# Patient Record
Sex: MALE | Race: WHITE | NOT HISPANIC OR LATINO | Employment: OTHER | URBAN - METROPOLITAN AREA
[De-identification: names, ages, dates, MRNs, and addresses within clinical notes are randomized per-mention and may not be internally consistent; named-entity substitution may affect disease eponyms.]

---

## 2017-01-30 ENCOUNTER — ALLSCRIPTS OFFICE VISIT (OUTPATIENT)
Dept: OTHER | Facility: OTHER | Age: 77
End: 2017-01-30

## 2017-01-30 DIAGNOSIS — R10.30 LOWER ABDOMINAL PAIN: ICD-10-CM

## 2017-02-06 ENCOUNTER — HOSPITAL ENCOUNTER (OUTPATIENT)
Dept: RADIOLOGY | Facility: HOSPITAL | Age: 77
Discharge: HOME | End: 2017-02-06
Payer: COMMERCIAL

## 2017-02-06 DIAGNOSIS — R10.30 LOWER ABDOMINAL PAIN: ICD-10-CM

## 2017-02-06 PROCEDURE — 73502 X-RAY EXAM HIP UNI 2-3 VIEWS: CPT

## 2017-02-06 PROCEDURE — 72110 X-RAY EXAM L-2 SPINE 4/>VWS: CPT

## 2017-02-07 ENCOUNTER — GENERIC CONVERSION - ENCOUNTER (OUTPATIENT)
Dept: OTHER | Facility: OTHER | Age: 77
End: 2017-02-07

## 2017-08-22 ENCOUNTER — ALLSCRIPTS OFFICE VISIT (OUTPATIENT)
Dept: OTHER | Facility: OTHER | Age: 77
End: 2017-08-22

## 2017-08-22 DIAGNOSIS — E78.00 PURE HYPERCHOLESTEROLEMIA: ICD-10-CM

## 2017-08-22 DIAGNOSIS — I10 ESSENTIAL (PRIMARY) HYPERTENSION: ICD-10-CM

## 2017-08-22 DIAGNOSIS — N20.0 CALCULUS OF KIDNEY: ICD-10-CM

## 2017-09-07 ENCOUNTER — APPOINTMENT (OUTPATIENT)
Dept: LAB | Facility: CLINIC | Age: 77
End: 2017-09-07
Payer: COMMERCIAL

## 2017-09-07 ENCOUNTER — GENERIC CONVERSION - ENCOUNTER (OUTPATIENT)
Dept: OTHER | Facility: OTHER | Age: 77
End: 2017-09-07

## 2017-09-07 ENCOUNTER — TRANSCRIBE ORDERS (OUTPATIENT)
Dept: LAB | Facility: CLINIC | Age: 77
End: 2017-09-07

## 2017-09-07 DIAGNOSIS — I10 ESSENTIAL (PRIMARY) HYPERTENSION: ICD-10-CM

## 2017-09-07 DIAGNOSIS — E78.00 PURE HYPERCHOLESTEROLEMIA: ICD-10-CM

## 2017-09-07 LAB
ALBUMIN SERPL BCP-MCNC: 3.6 G/DL (ref 3.5–5)
ALP SERPL-CCNC: 63 U/L (ref 46–116)
ALT SERPL W P-5'-P-CCNC: 30 U/L (ref 12–78)
AMORPH URATE CRY URNS QL MICRO: ABNORMAL /HPF
ANION GAP SERPL CALCULATED.3IONS-SCNC: 6 MMOL/L (ref 4–13)
AST SERPL W P-5'-P-CCNC: 18 U/L (ref 5–45)
BACTERIA UR QL AUTO: ABNORMAL /HPF
BILIRUB SERPL-MCNC: 0.73 MG/DL (ref 0.2–1)
BILIRUB UR QL STRIP: ABNORMAL
BUN SERPL-MCNC: 14 MG/DL (ref 5–25)
CALCIUM SERPL-MCNC: 9.1 MG/DL (ref 8.3–10.1)
CHLORIDE SERPL-SCNC: 104 MMOL/L (ref 100–108)
CHOLEST SERPL-MCNC: 165 MG/DL (ref 50–200)
CLARITY UR: ABNORMAL
CO2 SERPL-SCNC: 30 MMOL/L (ref 21–32)
COLOR UR: ABNORMAL
CREAT SERPL-MCNC: 0.73 MG/DL (ref 0.6–1.3)
GFR SERPL CREATININE-BSD FRML MDRD: 91 ML/MIN/1.73SQ M
GLUCOSE P FAST SERPL-MCNC: 98 MG/DL (ref 65–99)
GLUCOSE UR STRIP-MCNC: NEGATIVE MG/DL
HDLC SERPL-MCNC: 48 MG/DL (ref 40–60)
HGB UR QL STRIP.AUTO: ABNORMAL
KETONES UR STRIP-MCNC: NEGATIVE MG/DL
LEUKOCYTE ESTERASE UR QL STRIP: NEGATIVE
MUCOUS THREADS UR QL AUTO: ABNORMAL
NITRITE UR QL STRIP: NEGATIVE
NON-SQ EPI CELLS URNS QL MICRO: ABNORMAL /HPF
NONHDLC SERPL-MCNC: 117 MG/DL
PH UR STRIP.AUTO: 6 [PH] (ref 4.5–8)
POTASSIUM SERPL-SCNC: 4 MMOL/L (ref 3.5–5.3)
PROT SERPL-MCNC: 7.3 G/DL (ref 6.4–8.2)
PROT UR STRIP-MCNC: ABNORMAL MG/DL
RBC #/AREA URNS AUTO: ABNORMAL /HPF
SODIUM SERPL-SCNC: 140 MMOL/L (ref 136–145)
SP GR UR STRIP.AUTO: 1.02 (ref 1–1.03)
UROBILINOGEN UR QL STRIP.AUTO: 0.2 E.U./DL
WBC #/AREA URNS AUTO: ABNORMAL /HPF

## 2017-09-07 PROCEDURE — 36415 COLL VENOUS BLD VENIPUNCTURE: CPT

## 2017-09-07 PROCEDURE — 81001 URINALYSIS AUTO W/SCOPE: CPT

## 2017-09-07 PROCEDURE — 83718 ASSAY OF LIPOPROTEIN: CPT

## 2017-09-07 PROCEDURE — 82465 ASSAY BLD/SERUM CHOLESTEROL: CPT

## 2017-09-07 PROCEDURE — 80053 COMPREHEN METABOLIC PANEL: CPT

## 2017-09-18 ENCOUNTER — GENERIC CONVERSION - ENCOUNTER (OUTPATIENT)
Dept: OTHER | Facility: OTHER | Age: 77
End: 2017-09-18

## 2017-11-02 ENCOUNTER — ALLSCRIPTS OFFICE VISIT (OUTPATIENT)
Dept: OTHER | Facility: OTHER | Age: 77
End: 2017-11-02
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
## 2018-01-12 NOTE — RESULT NOTES
Verified Results  (1) LIPID PANEL, FASTING 75JBU7457 08:53AM [email or web address] Order Number: [id number]     Test Name Result Flag Reference   CHOLESTEROL 169 mg/dL     HDL,DIRECT 50 mg/dL  40-60   Specimen collection should occur prior to Metamizole administration due to the potential for falsely depressed results  LDL CHOLESTEROL CALCULATED 106 mg/dL H 0-100   - Patient Instructions: This is a fasting blood test  Water,black tea or black  coffee only after 9:00pm the night before test   Drink 2 glasses of water the morning of test     - Patient Instructions: This is a fasting blood test  Water,black tea or black  coffee only after 9:00pm the night before test Drink 2 glasses of water the morning of test   Triglyceride:         Normal              <150 mg/dl       Borderline High    150-199 mg/dl       High               200-499 mg/dl       Very High          >499 mg/dl  Cholesterol:         Desirable        <200 mg/dl      Borderline High  200-239 mg/dl      High             >239 mg/dl  HDL Cholesterol:        High    >59 mg/dL      Low     <41 mg/dL  LDL CALCULATED:    This screening LDL is a calculated result  It does not have the accuracy of the Direct Measured LDL in the monitoring of patients with hyperlipidemia and/or statin therapy  Direct Measure LDL (CZG285) must be ordered separately in these patients  TRIGLYCERIDES 65 mg/dL  <=150   Specimen collection should occur prior to N-Acetylcysteine or Metamizole administration due to the potential for falsely depressed results  (1) COMPREHENSIVE METABOLIC PANEL 36XJR3872 39:34TI [email or web address] Order Number: [id number]     Test Name Result Flag Reference   GLUCOSE,RANDM 99 mg/dL     If the patient is fasting, the ADA then defines impaired fasting glucose as > 100 mg/dL and diabetes as > or equal to 123 mg/dL     SODIUM 139 mmol/L  136-145   POTASSIUM 4 0 mmol/L  3 5-5 3   CHLORIDE 104 mmol/L  100-108   CARBON DIOXIDE 30 mmol/L  21-32   ANION GAP (CALC) 5 mmol/L  4-13   BLOOD UREA NITROGEN 15 mg/dL  5-25   CREATININE 0 74 mg/dL  0 60-1 30   Standardized to IDMS reference method   CALCIUM 9 3 mg/dL  8 3-10 1   BILI, TOTAL 0 90 mg/dL  0 20-1 00   ALK PHOSPHATAS 63 U/L     ALT (SGPT) 35 U/L  12-78   AST(SGOT) 18 U/L  5-45   ALBUMIN 3 7 g/dL  3 5-5 0   TOTAL PROTEIN 7 2 g/dL  6 4-8 2   eGFR Non-African American      >60 0 ml/min/1 73sq m   - Patient Instructions: This is a fasting blood test  Water,black tea or black  coffee only after 9:00pm the night before test Drink 2 glasses of water the morning of test   National Kidney Disease Education Program recommendations are as follows:  GFR calculation is accurate only with a steady state creatinine  Chronic Kidney disease less than 60 ml/min/1 73 sq  meters  Kidney failure less than 15 ml/min/1 73 sq  meters  (1) PSA (SCREEN) (Dx V76 44 Screen for Prostate Cancer) 41MYS4526 08:53AM [facility] Order Number: [id number]     Test Name Result Flag Reference   PROSTATE SPECIFIC ANTIGEN 2 4 ng/mL  0 0-4 0   American Urological Association Guidelines define biochemical recurrence of prostate cancer as a detectable or rising PSA value post-radical prostatectomy that is greater than or equal to 0 2 ng/mL with a second confirmatory level of greater than or equal to 0 2 ng/mL  - Patient Instructions: This test is non-fasting  Please drink two glasses of water morning of bloodwork  - Patient Instructions: This test is non-fasting  Please drink two glasses of water morning of bloodwork

## 2018-01-13 VITALS
HEIGHT: 70 IN | DIASTOLIC BLOOD PRESSURE: 80 MMHG | HEART RATE: 68 BPM | WEIGHT: 204.25 LBS | SYSTOLIC BLOOD PRESSURE: 138 MMHG | BODY MASS INDEX: 29.24 KG/M2

## 2018-01-13 VITALS
HEIGHT: 70 IN | SYSTOLIC BLOOD PRESSURE: 144 MMHG | WEIGHT: 208.25 LBS | DIASTOLIC BLOOD PRESSURE: 78 MMHG | HEART RATE: 76 BPM | BODY MASS INDEX: 29.81 KG/M2

## 2018-01-14 VITALS
HEIGHT: 70 IN | DIASTOLIC BLOOD PRESSURE: 84 MMHG | TEMPERATURE: 98.4 F | WEIGHT: 207.38 LBS | SYSTOLIC BLOOD PRESSURE: 140 MMHG | BODY MASS INDEX: 29.69 KG/M2

## 2018-01-16 NOTE — RESULT NOTES
Verified Results  * XR HIP/PELV 2-3 VWS RIGHT W PELVIS IF PERFORMED [id number] 09:27AM [facility] Order Number: [id number]   Performing Comments: pain right hip/ inguinal       assess degenerative change     Test Name Result Flag Reference   * XR HIP/PELV 2-3 VWS RIGHT (Report)     RIGHT HIP     INDICATION: Right lateral hip pain     COMPARISON: None     VIEWS: AP pelvis and 2 coned down views of the hip; 3 images     FINDINGS:     There is no acute fracture or dislocation  Mild degenerative changes of the bilateral hips  No lytic or blastic lesions are seen  Soft tissues are unremarkable  IMPRESSION:       1  No acute osseous abnormality  2  Degenerative changes as described  Workstation performed: [id number]     Signed by:   Cassidy De La Rosa MD   2/7/17     * XR SPINE LUMBAR MINIMUM 4 VIEWS NON INJURY [id number] 09:27AM [facility] Order Number: [id number]   Performing Comments: pain right buttocks/ inguinal     Test Name Result Flag Reference   XR SPINE LUMBAR MINIMUM 4 VIEWS (Report)     LUMBAR SPINE     INDICATION: Back and hip pain     COMPARISON: None     VIEWS: AP, lateral and bilateral oblique projections; 4 images     FINDINGS:     There is grade 1 anterolisthesis of L5 on S1  No evidence of spondylolysis  There is no radiographic evidence of acute fracture or destructive osseous lesion  There are moderate degenerative changes of the lower thoracic and lumbar spine most prominent at L4-5 and S1  There are facet joint degenerative changes at L4-5 and L5-S1  Visualized soft tissues appear unremarkable  There are postsurgical changes of the lower abdomen  IMPRESSION:     1  No acute osseous abnormality  Degenerative changes as described  2  Grade 1 anterolisthesis of L5 on S1 secondary to facet joint degenerative change         Workstation performed: [id number]     Signed by:   Cassidy De La Rosa MD   2/7/17

## 2018-01-16 NOTE — RESULT NOTES
Verified Results  (1) BASIC METABOLIC PROFILE 30HUF2324 09:44AM [facility]     Test Name Result Flag Reference   GLUCOSE,RANDM 91 mg/dL     If the patient is fasting, the ADA then defines impaired fasting glucose as > 100 mg/dL and diabetes as > or equal to 123 mg/dL  SODIUM 140 mmol/L  136-145   POTASSIUM 3 9 mmol/L  3 5-5 3   CHLORIDE 105 mmol/L  100-108   CARBON DIOXIDE 29 mmol/L  21-32   ANION GAP (CALC) 6 mmol/L  4-13   BLOOD UREA NITROGEN 13 mg/dL  5-25   CREATININE 0 65 mg/dL  0 60-1 30   Standardized to IDMS reference method   CALCIUM 8 4 mg/dL  8 3-10 1   eGFR Non-African American      >60 0 ml/min/1 73sq [facility] Energy Disease Education Program recommendations are as follows:  GFR calculation is accurate only with a steady state creatinine  Chronic Kidney disease less than 60 ml/min/1 73 sq  meters  Kidney failure less than 15 ml/min/1 73 sq  meters  No known food allergies reported. Pt denies micronutrient supplementation PTA. Currently, pt with a decreased appetite and refusing to eat hospital meals. Denies GI distress at this time and reports no difficulty chewing/swallowing. Pt refused remained of interview.

## 2018-01-17 NOTE — RESULT NOTES
Verified Results  (1) COMPREHENSIVE METABOLIC PANEL [id number] 14:56QF [employer] Order Number: [id number]     Test Name Result Flag Reference   SODIUM 140 mmol/L  136-145   POTASSIUM 4 0 mmol/L  3 5-5 3   CHLORIDE 104 mmol/L  100-108   CARBON DIOXIDE 30 mmol/L  21-32   ANION GAP (CALC) 6 mmol/L  4-13   BLOOD UREA NITROGEN 14 mg/dL  5-25   CREATININE 0 73 mg/dL  0 60-1 30   Standardized to IDMS reference method   CALCIUM 9 1 mg/dL  8 3-10 1   BILI, TOTAL 0 73 mg/dL  0 20-1 00   ALK PHOSPHATAS 63 U/L     ALT (SGPT) 30 U/L  12-78   Specimen collection should occur prior to Sulfasalazine and/or Sulfapyridine administration due to the potential for falsely depressed results  AST(SGOT) 18 U/L  5-45   Specimen collection should occur prior to Sulfasalazine administration due to the potential for falsely depressed results  ALBUMIN 3 6 g/dL  3 5-5 0   TOTAL PROTEIN 7 3 g/dL  6 4-8 2   eGFR 91 ml/min/1 73sq m     National Kidney Disease Education Program recommendations are as follows:  GFR calculation is accurate only with a steady state creatinine  Chronic Kidney disease less than 60 ml/min/1 73 sq  meters  Kidney failure less than 15 ml/min/1 73 sq  meters  GLUCOSE FASTING 98 mg/dL  65-99   Specimen collection should occur prior to Sulfasalazine administration due to the potential for falsely depressed results  Specimen collection should occur prior to Sulfapyridine administration due to the potential for falsely elevated results       (1) URINALYSIS WITH MICROSCOPIC [id number] 08:28AM [employer] Order Number: [id number]     Test Name Result Flag Reference   COLOR Dk Yellow     CLARITY Cloudy     PH UA 6 0  4 5-8 0   LEUKOCYTE ESTERASE UA Negative  Negative   NITRITE UA Negative  Negative   PROTEIN UA Trace mg/dl A Negative   GLUCOSE UA Negative mg/dl  Negative   KETONES UA Negative mg/dl  Negative   UROBILINOGEN UA 0 2 E U /dl  0 2, 1 0 E U /dl   BILIRUBIN UA  A Negative Interference- unable to analyze   The dipstick result may be falsely positive do to interfering substances  We recommend reliance upon serum bilirubin, liver & kidney function tests to guide patient care if clinically indicated  BLOOD UA Trace A Negative   SPECIFIC GRAVITY UA 1 021  1 003-1 030   WBC UA None Seen /hpf  None Seen   RBC UA None Seen /hpf  None Seen   BACTERIA Innumerable /hpf A None Seen, Occasional   AMORPHOUS URATES Moderate /hpf     EPITHELIAL CELLS None Seen /hpf  None Seen, Occasional   MUCOUS THREADS Occasional  Occasional, Moderate, Innumerable     (1) LIPID PANEL, NON FASTING W/O TRIG [id number] 08:28AM [facility] Order Number: [id number]     Test Name Result Flag Reference   NON HDL CHOLESTEROL 117 mg/dl     Cholesterol:         Desirable        <200 mg/dl      Borderline High  200-239 mg/dl      High             >239 mg/dl  HDL Cholesterol:        High    >59 mg/dL      Low     <41 mg/dL  NON-HDL-CHOLESTEROL:          Target Range  <=130mg/dl   CHOLESTEROL 165 mg/dL     HDL,DIRECT 48 mg/dL  40-60   Specimen collection should occur prior to Metamizole administration due to the potential for falsely depressed results

## 2018-01-22 VITALS
BODY MASS INDEX: 29.28 KG/M2 | DIASTOLIC BLOOD PRESSURE: 84 MMHG | SYSTOLIC BLOOD PRESSURE: 152 MMHG | WEIGHT: 204.5 LBS | HEIGHT: 70 IN

## 2018-01-26 RX ORDER — HYDROCHLOROTHIAZIDE 25 MG/1
TABLET ORAL
Refills: 1 | COMMUNITY
Start: 2017-11-12 | End: 2018-02-06 | Stop reason: SDUPTHER

## 2018-01-26 RX ORDER — LATANOPROST 50 UG/ML
SOLUTION/ DROPS OPHTHALMIC
COMMUNITY
End: 2020-09-30 | Stop reason: SDUPTHER

## 2018-01-26 RX ORDER — BRIMONIDINE TARTRATE, TIMOLOL MALEATE 2; 5 MG/ML; MG/ML
SOLUTION/ DROPS OPHTHALMIC
COMMUNITY
Start: 2011-06-02

## 2018-01-26 RX ORDER — SIMVASTATIN 5 MG
1 TABLET ORAL DAILY
COMMUNITY
Start: 2011-08-25 | End: 2018-02-06 | Stop reason: SDUPTHER

## 2018-01-28 ENCOUNTER — ANESTHESIA EVENT (OUTPATIENT)
Dept: GASTROENTEROLOGY | Facility: HOSPITAL | Age: 78
End: 2018-01-28
Payer: COMMERCIAL

## 2018-01-29 ENCOUNTER — ANESTHESIA (OUTPATIENT)
Dept: GASTROENTEROLOGY | Facility: HOSPITAL | Age: 78
End: 2018-01-29
Payer: COMMERCIAL

## 2018-01-29 ENCOUNTER — HOSPITAL ENCOUNTER (OUTPATIENT)
Facility: HOSPITAL | Age: 78
Setting detail: OUTPATIENT SURGERY
Discharge: HOME | End: 2018-01-29
Attending: COLON & RECTAL SURGERY | Admitting: COLON & RECTAL SURGERY
Payer: COMMERCIAL

## 2018-01-29 VITALS
TEMPERATURE: 97.8 F | RESPIRATION RATE: 16 BRPM | WEIGHT: 204 LBS | BODY MASS INDEX: 29.2 KG/M2 | OXYGEN SATURATION: 97 % | HEIGHT: 70 IN | DIASTOLIC BLOOD PRESSURE: 57 MMHG | HEART RATE: 61 BPM | SYSTOLIC BLOOD PRESSURE: 121 MMHG

## 2018-01-29 DIAGNOSIS — Z12.11 ENCOUNTER FOR SCREENING FOR MALIGNANT NEOPLASM OF COLON: ICD-10-CM

## 2018-01-29 DIAGNOSIS — Z86.010 HISTORY OF COLONIC POLYPS: ICD-10-CM

## 2018-01-29 PROCEDURE — 88305 TISSUE EXAM BY PATHOLOGIST: CPT | Performed by: COLON & RECTAL SURGERY

## 2018-01-29 PROCEDURE — 88305 TISSUE EXAM BY PATHOLOGIST: CPT | Performed by: PATHOLOGY

## 2018-01-29 RX ORDER — SODIUM CHLORIDE 9 MG/ML
125 INJECTION, SOLUTION INTRAVENOUS CONTINUOUS
Status: DISCONTINUED | OUTPATIENT
Start: 2018-01-29 | End: 2018-01-29 | Stop reason: HOSPADM

## 2018-01-29 RX ORDER — ONDANSETRON 2 MG/ML
4 INJECTION INTRAMUSCULAR; INTRAVENOUS EVERY 6 HOURS PRN
Status: DISCONTINUED | OUTPATIENT
Start: 2018-01-29 | End: 2018-01-29 | Stop reason: HOSPADM

## 2018-01-29 RX ORDER — PROPOFOL 10 MG/ML
INJECTION, EMULSION INTRAVENOUS AS NEEDED
Status: DISCONTINUED | OUTPATIENT
Start: 2018-01-29 | End: 2018-01-29 | Stop reason: SURG

## 2018-01-29 RX ADMIN — PROPOFOL 50 MG: 10 INJECTION, EMULSION INTRAVENOUS at 08:38

## 2018-01-29 RX ADMIN — PROPOFOL 125 MG: 10 INJECTION, EMULSION INTRAVENOUS at 08:32

## 2018-01-29 RX ADMIN — SODIUM CHLORIDE 125 ML/HR: 0.9 INJECTION, SOLUTION INTRAVENOUS at 07:37

## 2018-01-29 RX ADMIN — PROPOFOL 25 MG: 10 INJECTION, EMULSION INTRAVENOUS at 08:45

## 2018-01-29 RX ADMIN — SODIUM CHLORIDE: 0.9 INJECTION, SOLUTION INTRAVENOUS at 08:30

## 2018-01-29 RX ADMIN — LIDOCAINE HYDROCHLORIDE 60 MG: 20 INJECTION, SOLUTION INTRAVENOUS at 08:32

## 2018-01-29 NOTE — OP NOTE
OPERATIVE REPORT  PATIENT NAME: Kassy Mayo    :  1940  MRN: [id number]  Pt Location: [facility] ROOM 01    SURGERY DATE: 2018    Indications:  Of colon polyps  Personal history    Procedure:  Colonoscopy to cecum  With polypectomies  Findings:  Transverse colon polyp and sigmoid colon polyp    Anesthesia Type: IV Sedation with Anesthesia    Complications: None      Procedure: The patient was in the left lateral position  Sedation was administered by anesthesia  Rectal exam showed a large prostate  The scope was introduced and passed to the cecum  This was confirmed by the ileocecal valve and the appendiceal orifice  The scope was removed  The bowel preparation was poor  In the transverse colon was a diminutive polyp excised with the biopsy forceps  In the sigmoid colon was moderate diverticulosis  There was a 5 mm polyp excised by hot snare and collected  Scope was removed  Impression:  Status post polypectomies  Plan:  Repeat colonoscopy 3 years  Specimen(s):  ID Type Source Tests Collected by Time Destination   1 : Transverse colon polyp Tissue Large Intestine, Transverse Colon TISSUE EXAM Efrem Parsons MD 2018 [id number]          Attestation: I was present for the entire procedure        Patient Disposition: PACU      SIGNATURE: Efrem Parsons MD  DATE: 2018  TIME: 8:55 AM

## 2018-01-29 NOTE — DISCHARGE INSTRUCTIONS
[facility] [address], [address] [place] [address] [address]  Phone: [phone]    DISCHARGE INSTRUCTIONS:    1   ___ Complete Exam - Normal    2   ___ Exam normal, but entire colon not seen  We will discuss this with you  3   ___ Polyp(s) removed by "burning" - NO pathology report will follow    4   _2__ Polyp(s) removed by excision  Pathology report will be available in 4-5 days   Someone from our office will call you with results  5   ___ Exam prompted biopsies  Pathology report will be available in 4-5 days   Someone from our office will call you with results  6   ___ Exam demonstrated findings that need treatment  Prescriptions will be   Given to you  Return to our office in ____ weeks  Please call for appt  7   ___ Original office visit or colonoscopy findings necessitate an office visit  Please call to set up a new appointment    8   ___ Medication  __________________________________________        [address] [facility]:    - Go straight home and rest today    - No driving or drinking alcohol for 24 hours    - Resume regular diet and medications unless otherwise instructed  Coumadin and Plavix are blood thinners  You can resume these medications on __________  IF YOU ARE HAVING ANY FEVER, BLEEDING OR PERSISTENT PAIN IN THE ABDOMEN, PLEASE CALL OUR OFFICE ANY DAY OR TIME  [phone]        Colonoscopy   WHAT YOU NEED TO KNOW:   A colonoscopy is a procedure to examine the inside of your colon (intestine) with a scope  Polyps or tissue growths may have been removed during your colonoscopy  It is normal to feel bloated and to have some abdominal discomfort  You should be passing gas  If you have hemorrhoids or you had polyps removed, you may have a small amount of bleeding  DISCHARGE INSTRUCTIONS:   Seek care immediately if:   · You have a large amount of bright red blood in your bowel movements      · Your abdomen is hard and firm and you have severe pain  · You have sudden trouble breathing  Contact your healthcare provider if:   · You develop a rash or hives  · You have a fever within 24 hours of your procedure  · You have not had a bowel movement for 3 days after your procedure  · You have questions or concerns about your condition or care  Activity:   · Do not lift, strain, or run  for 3 days after your procedure  · Rest after your procedure  You have been given medicine to relax you  Do not  drive or make important decisions until the day after your procedure  Return to your normal activity as directed  · Relieve gas and discomfort from bloating  by lying on your right side with a heating pad on your abdomen  You may need to take short walks to help the gas move out  Eat small meals until bloating is relieved  If you had polyps removed: For 7 days after your procedure:  · Do not  take aspirin  · Do not  go on long car rides  Help prevent constipation:   · Eat a variety of healthy foods  Healthy foods include fruit, vegetables, whole-grain breads, low-fat dairy products, beans, lean meat, and fish  Ask if you need to be on a special diet  Your healthcare provider may recommend that you eat high-fiber foods such as cooked beans  Fiber helps you have regular bowel movements  · Drink liquids as directed  Adults should drink between 9 and 13 eight-ounce cups of liquid every day  Ask what amount is best for you  For most people, good liquids to drink are water, juice, and milk  · Exercise as directed  Talk to your healthcare provider about the best exercise plan for you  Exercise can help prevent constipation, decrease your blood pressure and improve your health  Follow up with your healthcare provider as directed:  Write down your questions so you remember to ask them during your visits     © 2017 [employer] Information is for End User's use only and may not be sold, redistributed or otherwise used for commercial purposes  All illustrations and images included in CareNotes® are the copyrighted property of [employer]  or [employer] Huber  The above information is an  only  It is not intended as medical advice for individual conditions or treatments  Talk to your doctor, nurse or pharmacist before following any medical regimen to see if it is safe and effective for you  Colorectal Polyps   WHAT YOU NEED TO KNOW:   What are colorectal polyps? Colorectal polyps are small growths of tissue in the lining of the colon and rectum  Most polyps are hyperplastic polyps and are usually benign (noncancerous)  Certain types of polyps, called adenomatous polyps, may turn into cancer  What increases my risk of colorectal polyps? The exact cause of colorectal polyps is unknown  The following may increase your risk:  · Older age    · A diet of foods high in fat and low in fiber     · Family history of polyps    · Intestinal diseases, such as Crohn's disease or ulcerative colitis    · An unhealthy lifestyle, such as physical inactivity, smoking, or drinking alcohol    · Obesity  What are the signs and symptoms of colorectal polyps? · Blood in your bowel movement or bleeding from the rectum    · Change in bowel movement habits, such as diarrhea and constipation    · Abdominal pain  How are colorectal polyps diagnosed? You should have fecal blood screening once a year for colorectal disease if you are over 48years old  You should be screened earlier if you have an intestinal disease or a family history of polyps or colorectal cancer  During this screening, a sample of your bowel movement is checked for blood, which may be an early sign of colorectal polyps or cancer  You may also need any of the following tests:  · Digital rectal exam:  Your healthcare provider will examine your anus and use a finger to check your rectum for polyps       · Barium enema: A barium enema is an x-ray of the colon  A tube is put into your anus, and a liquid called barium is put through the tube  Barium is used so that caregivers can see your colon better on the x-ray film  · Virtual colonoscopy: This is a CT scan that takes pictures of the inside of your colon and rectum  A small, flexible tube is put into your rectum and air or carbon dioxide (gas) is used to expand your colon  This lets healthcare providers clearly see your colon and any polyps on a monitor  · Colonoscopy or sigmoidoscopy: These procedures help your healthcare provider see the inside of your colon using a flexible tube with a small light and camera on the end  During a sigmoidoscopy, your healthcare provider will only look at rectum and lower colon  During a colonoscopy, healthcare providers will look at the full length of your colon  Healthcare providers may remove a small amount of tissue from the colon for a biopsy  How are colorectal polyps treated? · Polyp removal:  Polyps may be removed during your sigmoidoscopy or colonoscopy  · Polypectomy: This is surgery to remove your polyps  You may need laparoscopic or open surgery, depending on the type, size, and number of polyps that you have  Laparoscopy is done by inserting a small, flexible scope into incisions made on your abdomen  Open surgery is done by making a larger incision on your abdomen   What are the risks of colorectal polyps? You may bleed during a colonoscopy procedure  Your bowel may be perforated (torn) when polyps are removed  This may lead to an open abdominal surgery  During surgery, you may bleed too much or get an infection  Adenomatous polyps that are not removed may turn into cancer and become more difficult to treat  Where can I find support and more information? · [address] ([facility])  [address], [place] [address]  Phone: [phone]  Web Address: Eddie Steele  [facility] gov  When should I contact my healthcare provider? · You have a fever  · You have chills, a cough, or feel weak and achy  · You have abdominal pain that does not go away or gets worse after you take medicine  · Your abdomen is swollen  · You are losing weight without trying  · You have questions or concerns about your condition or care  When should I seek immediate care or call 911? · You have sudden shortness of breath  · You have a fast heart rate, fast breathing, or are too dizzy to stand up  · You have severe abdominal pain  · You see blood in your bowel movement  CARE AGREEMENT:   You have the right to help plan your care  Learn about your health condition and how it may be treated  Discuss treatment options with your caregivers to decide what care you want to receive  You always have the right to refuse treatment  The above information is an  only  It is not intended as medical advice for individual conditions or treatments  Talk to your doctor, nurse or pharmacist before following any medical regimen to see if it is safe and effective for you  © 2017 [address]  Information is for End User's use only and may not be sold, redistributed or otherwise used for commercial purposes  All illustrations and images included in CareNotes® are the copyrighted property of A [employer] A M , Inc  or Doc Huber

## 2018-01-29 NOTE — ANESTHESIA PREPROCEDURE EVALUATION
Review of Systems/Medical History  Patient summary reviewed  Chart reviewed      Cardiovascular  Exercise tolerance: good,  Hyperlipidemia, Hypertension controlled,    Pulmonary  Negative pulmonary ROS        GI/Hepatic    Bowel prep       Kidney stones,        Endo/Other  Negative endo/other ROS      GYN  Negative gynecology ROS          Hematology  Negative hematology ROS      Musculoskeletal  Negative musculoskeletal ROS        Neurology  Negative neurology ROS      Psychology   Negative psychology ROS              Physical Exam    Airway    Mallampati score: II  TM Distance: <3 FB  Neck ROM: full     Dental       Cardiovascular  Rhythm: regular, Rate: normal,     Pulmonary  Breath sounds clear to auscultation,     Other Findings  partial      Anesthesia Plan  ASA Score- 2     Anesthesia Type- IV sedation with anesthesia with ASA Monitors  Additional Monitors:   Airway Plan:         Plan Factors- Patient instructed to abstain from smoking on day of procedure  Patient did not smoke on day of surgery  Induction- intravenous  Postoperative Plan-     Informed Consent- Anesthetic plan and risks discussed with patient

## 2018-02-06 DIAGNOSIS — I10 HYPERTENSION, UNSPECIFIED TYPE: ICD-10-CM

## 2018-02-06 DIAGNOSIS — E78.00 HYPERCHOLESTEREMIA: Primary | ICD-10-CM

## 2018-02-06 RX ORDER — SIMVASTATIN 5 MG
5 TABLET ORAL DAILY
Qty: 30 TABLET | Refills: 5 | Status: SHIPPED | OUTPATIENT
Start: 2018-02-06 | End: 2018-02-07 | Stop reason: SDUPTHER

## 2018-02-06 RX ORDER — HYDROCHLOROTHIAZIDE 25 MG/1
12.5 TABLET ORAL DAILY
Qty: 15 TABLET | Refills: 5 | Status: SHIPPED | OUTPATIENT
Start: 2018-02-06 | End: 2018-02-07 | Stop reason: SDUPTHER

## 2018-02-07 DIAGNOSIS — E78.00 HYPERCHOLESTEREMIA: ICD-10-CM

## 2018-02-07 DIAGNOSIS — I10 HYPERTENSION, UNSPECIFIED TYPE: ICD-10-CM

## 2018-02-07 RX ORDER — SIMVASTATIN 5 MG
TABLET ORAL
Qty: 90 TABLET | Refills: 1 | Status: SHIPPED | OUTPATIENT
Start: 2018-02-07 | End: 2018-09-08 | Stop reason: SDUPTHER

## 2018-02-07 RX ORDER — HYDROCHLOROTHIAZIDE 25 MG/1
TABLET ORAL
Qty: 45 TABLET | Refills: 1 | Status: SHIPPED | OUTPATIENT
Start: 2018-02-07 | End: 2018-09-08 | Stop reason: SDUPTHER

## 2018-02-24 PROBLEM — N20.0 NEPHROLITHIASIS: Status: ACTIVE | Noted: 2017-09-18

## 2018-02-24 PROBLEM — M17.10 OSTEOARTHRITIS, KNEE: Status: ACTIVE | Noted: 2017-05-01

## 2018-02-24 PROBLEM — M17.9 OSTEOARTHRITIS, KNEE: Status: ACTIVE | Noted: 2017-05-01

## 2018-02-24 PROBLEM — M54.50 LEFT LOW BACK PAIN: Status: ACTIVE | Noted: 2017-09-18

## 2018-02-27 ENCOUNTER — OFFICE VISIT (OUTPATIENT)
Dept: FAMILY MEDICINE CLINIC | Facility: CLINIC | Age: 78
End: 2018-02-27
Payer: COMMERCIAL

## 2018-02-27 VITALS
DIASTOLIC BLOOD PRESSURE: 74 MMHG | HEIGHT: 70 IN | OXYGEN SATURATION: 95 % | WEIGHT: 209.4 LBS | TEMPERATURE: 97.7 F | SYSTOLIC BLOOD PRESSURE: 138 MMHG | HEART RATE: 66 BPM | BODY MASS INDEX: 29.98 KG/M2

## 2018-02-27 DIAGNOSIS — I10 ESSENTIAL HYPERTENSION: Primary | ICD-10-CM

## 2018-02-27 DIAGNOSIS — E78.00 HYPERCHOLESTEREMIA: ICD-10-CM

## 2018-02-27 DIAGNOSIS — I10 HYPERTENSION, UNSPECIFIED TYPE: ICD-10-CM

## 2018-02-27 PROCEDURE — 1101F PT FALLS ASSESS-DOCD LE1/YR: CPT | Performed by: FAMILY MEDICINE

## 2018-02-27 PROCEDURE — 99213 OFFICE O/P EST LOW 20 MIN: CPT | Performed by: FAMILY MEDICINE

## 2018-02-27 RX ORDER — HYDROCHLOROTHIAZIDE 25 MG/1
12.5 TABLET ORAL DAILY
Qty: 45 TABLET | Refills: 3 | Status: SHIPPED | OUTPATIENT
Start: 2018-02-27 | End: 2018-11-12 | Stop reason: SDUPTHER

## 2018-02-27 RX ORDER — SIMVASTATIN 5 MG
5 TABLET ORAL DAILY
Qty: 90 TABLET | Refills: 3 | Status: SHIPPED | OUTPATIENT
Start: 2018-02-27 | End: 2019-06-16 | Stop reason: SDUPTHER

## 2018-02-27 NOTE — PATIENT INSTRUCTIONS
He is in for his routine visit, blood pressure is fine here today, he will continue on blood pressure medication along with statin as is, September blood work showed CMP, lipids, urinalysis to be fine  We will see him back in 6 months, redo AWV then along w BW    Use Flonase re congestion along w nasal saline       He also had undergone screening colonoscopy January 29th, had a polyp, redo 3 years with doctor Charles
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
## 2018-08-28 ENCOUNTER — OFFICE VISIT (OUTPATIENT)
Dept: FAMILY MEDICINE CLINIC | Facility: CLINIC | Age: 78
End: 2018-08-28
Payer: COMMERCIAL

## 2018-08-28 VITALS
WEIGHT: 212.6 LBS | DIASTOLIC BLOOD PRESSURE: 76 MMHG | HEART RATE: 66 BPM | HEIGHT: 70 IN | SYSTOLIC BLOOD PRESSURE: 134 MMHG | BODY MASS INDEX: 30.43 KG/M2 | OXYGEN SATURATION: 97 %

## 2018-08-28 DIAGNOSIS — I10 ESSENTIAL HYPERTENSION: ICD-10-CM

## 2018-08-28 DIAGNOSIS — Z00.00 MEDICARE ANNUAL WELLNESS VISIT, SUBSEQUENT: Primary | ICD-10-CM

## 2018-08-28 DIAGNOSIS — E78.00 HYPERCHOLESTEREMIA: ICD-10-CM

## 2018-08-28 PROBLEM — M54.50 LEFT LOW BACK PAIN: Status: RESOLVED | Noted: 2017-09-18 | Resolved: 2018-08-28

## 2018-08-28 PROCEDURE — 99213 OFFICE O/P EST LOW 20 MIN: CPT | Performed by: FAMILY MEDICINE

## 2018-08-28 PROCEDURE — 1170F FXNL STATUS ASSESSED: CPT | Performed by: FAMILY MEDICINE

## 2018-08-28 PROCEDURE — 3725F SCREEN DEPRESSION PERFORMED: CPT | Performed by: FAMILY MEDICINE

## 2018-08-28 PROCEDURE — G0439 PPPS, SUBSEQ VISIT: HCPCS | Performed by: FAMILY MEDICINE

## 2018-08-28 PROCEDURE — 3078F DIAST BP <80 MM HG: CPT | Performed by: FAMILY MEDICINE

## 2018-08-28 PROCEDURE — 3075F SYST BP GE 130 - 139MM HG: CPT | Performed by: FAMILY MEDICINE

## 2018-08-28 PROCEDURE — 1101F PT FALLS ASSESS-DOCD LE1/YR: CPT | Performed by: FAMILY MEDICINE

## 2018-08-28 PROCEDURE — 1125F AMNT PAIN NOTED PAIN PRSNT: CPT | Performed by: FAMILY MEDICINE

## 2018-08-28 PROCEDURE — 3008F BODY MASS INDEX DOCD: CPT | Performed by: FAMILY MEDICINE

## 2018-08-28 NOTE — PATIENT INSTRUCTIONS
He is in today for a Subsequent Medicare evaluation/ regular check  He is doing well here today, he will continue on hydrochlorothiazide 25 mg 1/2 tablet daily for blood pressure control, he will also continue on simvastatin, only on 5 mg daily  Slip given to redo fasting blood work along with urinalysis  He has had no issues with kidney stones, make sure to keep hydrated  He has not been able to walk is much outdoors with our rainy August weather, he will increase his walking again    Immunization History   Administered Date(s) Administered    Influenza Split High Dose Preservative Free IM 11/09/2015, 01/30/2017, 11/02/2017    Pneumococcal Conjugate 13-Valent 01/04/2016    Pneumococcal Polysaccharide PPV23 08/22/2017    Td (adult), adsorbed 03/02/2009    Tdap 11/15/2016       Discussed Pneumococcal vaccines,    Prevnar  is up to date   Pneumovax  is up to date  He does do yearly Flu shot  Tdap/tetanus shot is up to date  (done every 10 yrs for superficial cuts, every 5 yrs for deep wounds)   Can also look into coverage for new shingles shot, Shingrix (indicated if over 48years of age ) Can do that at pharmacy  Was never a smoker     Regarding Colon Cancer screening, we discussed Colonoscopy vs ColoGuard :   Screening is up to date   1/18 Dr Kale Woodson-   Had polyps - redo 2021      Discussed pros and cons regarding Prostate Cancer screening,   PSA blood test not indicated   PSA stable [id number] at 2 to 2 4 range  We discussed end of life planning, does not have a  "LIVING WILL"   "FIVE WISHES" handout was discussed and given to fill out at home at last AWV -   He plans to see  to develop advanced directive  Glaucoma screening is up-to-date    We did review previous blood work,   He is up to date with Lipid screening  He is up to date with Diabetes screening    Discussed importance of routine exercise, healthy diet     Malinda Rivero -  Can see Dermatology for full body skin exam/skin cancer screening,   should see Dentist routinely      We will see him again in 6-8 months, sooner as needed
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
## 2018-09-06 ENCOUNTER — APPOINTMENT (OUTPATIENT)
Dept: LAB | Facility: CLINIC | Age: 78
End: 2018-09-06
Payer: COMMERCIAL

## 2018-09-06 LAB
ALBUMIN SERPL BCP-MCNC: 3.6 G/DL (ref 3.5–5)
ALP SERPL-CCNC: 53 U/L (ref 46–116)
ALT SERPL W P-5'-P-CCNC: 35 U/L (ref 12–78)
ANION GAP SERPL CALCULATED.3IONS-SCNC: 4 MMOL/L (ref 4–13)
AST SERPL W P-5'-P-CCNC: 22 U/L (ref 5–45)
BACTERIA UR QL AUTO: ABNORMAL /HPF
BILIRUB SERPL-MCNC: 0.82 MG/DL (ref 0.2–1)
BILIRUB UR QL STRIP: ABNORMAL
BUN SERPL-MCNC: 14 MG/DL (ref 5–25)
CALCIUM SERPL-MCNC: 8.8 MG/DL (ref 8.3–10.1)
CHLORIDE SERPL-SCNC: 105 MMOL/L (ref 100–108)
CHOLEST SERPL-MCNC: 142 MG/DL (ref 50–200)
CLARITY UR: CLEAR
CO2 SERPL-SCNC: 29 MMOL/L (ref 21–32)
COLOR UR: ABNORMAL
CREAT SERPL-MCNC: 0.75 MG/DL (ref 0.6–1.3)
GFR SERPL CREATININE-BSD FRML MDRD: 90 ML/MIN/1.73SQ M
GLUCOSE P FAST SERPL-MCNC: 101 MG/DL (ref 65–99)
GLUCOSE UR STRIP-MCNC: NEGATIVE MG/DL
HDLC SERPL-MCNC: 44 MG/DL (ref 40–60)
HGB UR QL STRIP.AUTO: NEGATIVE
HYALINE CASTS #/AREA URNS LPF: ABNORMAL /LPF
KETONES UR STRIP-MCNC: NEGATIVE MG/DL
LDLC SERPL CALC-MCNC: 85 MG/DL (ref 0–100)
LEUKOCYTE ESTERASE UR QL STRIP: NEGATIVE
NITRITE UR QL STRIP: NEGATIVE
NON-SQ EPI CELLS URNS QL MICRO: ABNORMAL /HPF
NONHDLC SERPL-MCNC: 98 MG/DL
PH UR STRIP.AUTO: 6 [PH] (ref 4.5–8)
POTASSIUM SERPL-SCNC: 3.8 MMOL/L (ref 3.5–5.3)
PROT SERPL-MCNC: 7.4 G/DL (ref 6.4–8.2)
PROT UR STRIP-MCNC: ABNORMAL MG/DL
RBC #/AREA URNS AUTO: ABNORMAL /HPF
SODIUM SERPL-SCNC: 138 MMOL/L (ref 136–145)
SP GR UR STRIP.AUTO: 1.02 (ref 1–1.03)
TRIGL SERPL-MCNC: 64 MG/DL
UROBILINOGEN UR QL STRIP.AUTO: 0.2 E.U./DL
WBC #/AREA URNS AUTO: ABNORMAL /HPF

## 2018-09-06 PROCEDURE — 80053 COMPREHEN METABOLIC PANEL: CPT | Performed by: FAMILY MEDICINE

## 2018-09-06 PROCEDURE — 36415 COLL VENOUS BLD VENIPUNCTURE: CPT | Performed by: FAMILY MEDICINE

## 2018-09-06 PROCEDURE — 80061 LIPID PANEL: CPT | Performed by: FAMILY MEDICINE

## 2018-09-06 PROCEDURE — 81001 URINALYSIS AUTO W/SCOPE: CPT | Performed by: FAMILY MEDICINE

## 2018-09-08 DIAGNOSIS — I10 HYPERTENSION, UNSPECIFIED TYPE: ICD-10-CM

## 2018-09-08 DIAGNOSIS — E78.00 HYPERCHOLESTEREMIA: ICD-10-CM

## 2018-09-08 RX ORDER — HYDROCHLOROTHIAZIDE 25 MG/1
TABLET ORAL
Qty: 45 TABLET | Refills: 3 | Status: SHIPPED | OUTPATIENT
Start: 2018-09-08 | End: 2019-06-16 | Stop reason: SDUPTHER

## 2018-09-08 RX ORDER — SIMVASTATIN 5 MG
TABLET ORAL
Qty: 90 TABLET | Refills: 3 | Status: SHIPPED | OUTPATIENT
Start: 2018-09-08 | End: 2018-11-12 | Stop reason: SDUPTHER

## 2018-11-12 ENCOUNTER — OFFICE VISIT (OUTPATIENT)
Dept: FAMILY MEDICINE CLINIC | Facility: CLINIC | Age: 78
End: 2018-11-12
Payer: COMMERCIAL

## 2018-11-12 VITALS
WEIGHT: 219.2 LBS | HEART RATE: 70 BPM | SYSTOLIC BLOOD PRESSURE: 136 MMHG | RESPIRATION RATE: 18 BRPM | DIASTOLIC BLOOD PRESSURE: 72 MMHG | HEIGHT: 70 IN | BODY MASS INDEX: 31.38 KG/M2

## 2018-11-12 DIAGNOSIS — Z23 NEED FOR INFLUENZA VACCINATION: ICD-10-CM

## 2018-11-12 DIAGNOSIS — L73.9 FOLLICULITIS: Primary | ICD-10-CM

## 2018-11-12 PROCEDURE — 1160F RVW MEDS BY RX/DR IN RCRD: CPT

## 2018-11-12 PROCEDURE — 1036F TOBACCO NON-USER: CPT | Performed by: INTERNAL MEDICINE

## 2018-11-12 PROCEDURE — 4040F PNEUMOC VAC/ADMIN/RCVD: CPT

## 2018-11-12 PROCEDURE — 99213 OFFICE O/P EST LOW 20 MIN: CPT | Performed by: INTERNAL MEDICINE

## 2018-11-12 PROCEDURE — 1160F RVW MEDS BY RX/DR IN RCRD: CPT | Performed by: INTERNAL MEDICINE

## 2018-11-12 PROCEDURE — 3008F BODY MASS INDEX DOCD: CPT | Performed by: INTERNAL MEDICINE

## 2018-11-12 PROCEDURE — 90662 IIV NO PRSV INCREASED AG IM: CPT

## 2018-11-12 PROCEDURE — G0008 ADMIN INFLUENZA VIRUS VAC: HCPCS

## 2018-11-12 NOTE — PROGRESS NOTES
Assessment/Plan:     Diagnoses and all orders for this visit:    Folliculitis    Need for influenza vaccination  -     influenza vaccine, [id number], high-dose, PF 0 5 mL, for patients 65 yr+ (FLUZONE HIGH-DOSE)      Gini Pike was seen and examined in the office today  I asked that he try and use a compress to the area along with topical Bacitracin  Monitor for any signs of infection  Otherwise no other changes were made  Subjective:      Patient ID: Scotty Venegas is a 70 y o  male  Gini Pike is here today for an evaluation of a skin lesion of the left chest wall that he has noticed for about 2 weeks  He denies any pain, drainage, fevers, chills, or systemic complaints  He reports washing the area and applies Vaseline as well  The following portions of the patient's history were reviewed and updated as appropriate: allergies, current medications, past family history, past medical history, past social history, past surgical history and problem list     Review of Systems   Constitutional: Negative for chills, fatigue and fever  Cardiovascular: Negative for chest pain and palpitations  Skin: Positive for rash  Neurological: Negative for dizziness  Hematological: Negative for adenopathy  Does not bruise/bleed easily  Objective:      /72   Pulse 70   Resp 18   Ht 5' 10" (1 778 m)   Wt 99 4 kg (219 lb 3 2 oz)   BMI 31 45 kg/m²          Physical Exam   Constitutional: He is oriented to person, place, and time  He appears well-developed and well-nourished  No distress  HENT:   Head: Normocephalic and atraumatic  Cardiovascular: Normal rate  Pulmonary/Chest: Effort normal    Neurological: He is alert and oriented to person, place, and time  Skin: He is not diaphoretic  Folliculitis like lesion noted on left chest wall without any expressible fluid   Psychiatric: He has a normal mood and affect   His behavior is normal  Judgment and thought content normal

## 2018-11-19 ENCOUNTER — TELEPHONE (OUTPATIENT)
Dept: FAMILY MEDICINE CLINIC | Facility: CLINIC | Age: 78
End: 2018-11-19

## 2018-11-20 NOTE — TELEPHONE ENCOUNTER
Okay  I would watch the lesion for now  It did not seem infectious where it needed an antibiotic but I want him to watch for any changes such as increasing in size, bleeding, or pain

## 2019-06-16 DIAGNOSIS — E78.00 HYPERCHOLESTEREMIA: ICD-10-CM

## 2019-06-16 DIAGNOSIS — I10 HYPERTENSION, UNSPECIFIED TYPE: ICD-10-CM

## 2019-06-17 RX ORDER — SIMVASTATIN 5 MG
5 TABLET ORAL DAILY
Qty: 90 TABLET | Refills: 1 | Status: SHIPPED | OUTPATIENT
Start: 2019-06-17 | End: 2019-09-05 | Stop reason: SDUPTHER

## 2019-06-17 RX ORDER — HYDROCHLOROTHIAZIDE 25 MG/1
12.5 TABLET ORAL DAILY
Qty: 45 TABLET | Refills: 1 | Status: SHIPPED | OUTPATIENT
Start: 2019-06-17 | End: 2019-09-05 | Stop reason: SDUPTHER

## 2019-09-05 ENCOUNTER — OFFICE VISIT (OUTPATIENT)
Dept: FAMILY MEDICINE CLINIC | Facility: CLINIC | Age: 79
End: 2019-09-05
Payer: COMMERCIAL

## 2019-09-05 VITALS
SYSTOLIC BLOOD PRESSURE: 128 MMHG | WEIGHT: 207.2 LBS | BODY MASS INDEX: 29.66 KG/M2 | OXYGEN SATURATION: 96 % | DIASTOLIC BLOOD PRESSURE: 80 MMHG | HEART RATE: 70 BPM | HEIGHT: 70 IN

## 2019-09-05 DIAGNOSIS — Z00.00 MEDICARE ANNUAL WELLNESS VISIT, SUBSEQUENT: Primary | ICD-10-CM

## 2019-09-05 DIAGNOSIS — I10 HYPERTENSION, UNSPECIFIED TYPE: ICD-10-CM

## 2019-09-05 DIAGNOSIS — E78.00 HYPERCHOLESTEREMIA: ICD-10-CM

## 2019-09-05 DIAGNOSIS — I10 ESSENTIAL HYPERTENSION: ICD-10-CM

## 2019-09-05 DIAGNOSIS — Z12.5 SCREENING PSA (PROSTATE SPECIFIC ANTIGEN): ICD-10-CM

## 2019-09-05 PROBLEM — L73.9 FOLLICULITIS: Status: RESOLVED | Noted: 2018-11-12 | Resolved: 2019-09-05

## 2019-09-05 PROCEDURE — G0439 PPPS, SUBSEQ VISIT: HCPCS | Performed by: FAMILY MEDICINE

## 2019-09-05 PROCEDURE — 3079F DIAST BP 80-89 MM HG: CPT | Performed by: FAMILY MEDICINE

## 2019-09-05 PROCEDURE — 1125F AMNT PAIN NOTED PAIN PRSNT: CPT | Performed by: FAMILY MEDICINE

## 2019-09-05 PROCEDURE — 1101F PT FALLS ASSESS-DOCD LE1/YR: CPT | Performed by: FAMILY MEDICINE

## 2019-09-05 PROCEDURE — 3074F SYST BP LT 130 MM HG: CPT | Performed by: FAMILY MEDICINE

## 2019-09-05 PROCEDURE — 99213 OFFICE O/P EST LOW 20 MIN: CPT | Performed by: FAMILY MEDICINE

## 2019-09-05 PROCEDURE — 1170F FXNL STATUS ASSESSED: CPT | Performed by: FAMILY MEDICINE

## 2019-09-05 RX ORDER — HYDROCHLOROTHIAZIDE 25 MG/1
12.5 TABLET ORAL DAILY
Qty: 45 TABLET | Refills: 3 | Status: SHIPPED | OUTPATIENT
Start: 2019-09-05 | End: 2020-10-01 | Stop reason: SDUPTHER

## 2019-09-05 RX ORDER — SIMVASTATIN 5 MG
5 TABLET ORAL DAILY
Qty: 90 TABLET | Refills: 3 | Status: SHIPPED | OUTPATIENT
Start: 2019-09-05 | End: 2020-10-01 | Stop reason: SDUPTHER

## 2019-09-05 NOTE — PATIENT INSTRUCTIONS
Reviewed health history along with medications  Doing very well  Blood pressure is excellent here along with at home, he continues on low-dose hydrochlorothiazide 12 5 mg daily  We did review previous blood work, 1 year ago cholesterol excellent 142 with HDL 44, LDL 85  Continues on simvastatin 5 mg daily  Slip given to redo fasting lipids  History kidney stones, no complaints over 1 year, few RBCs on last urinalysis, await redo  Immunization History   Administered Date(s) Administered    Influenza Split High Dose Preservative Free IM 11/09/2015, 01/30/2017, 11/02/2017    Influenza, high dose seasonal 0 5 mL 11/12/2018    Pneumococcal Conjugate 13-Valent 01/04/2016    Pneumococcal Polysaccharide PPV23 08/22/2017    Td (adult), adsorbed 03/02/2009    Tdap 11/15/2016     Discussed Vaccines,    Prevnar  is up to date    Pneumovax  is up to date  He does do yearly Flu shot  Tdap/tetanus shot is up to date  (done every 10 yrs for superficial cuts, every 5 yrs for deep wounds)   Can also look into coverage for new shingles shot, Shingrix  Can do that at pharmacy  Was never a smoker     Regarding Colon Cancer screening,   Screening is up to date -   Adenomatous polyp 1/18 w Dr Christian Day in 3 yrs was advised  Discussed pros and cons regarding Prostate Cancer screening, routine screening not advised after 70 -   He elects to redo -   Previous PSA 2-2 4 ( 2014-16)    We discussed end of life planning, does not have a  "LIVING WILL"   "FIVE WISHES" handout was discussed and given to fill out at home  Glaucoma screening is up-to-date    Discussed importance of routine exercise ( walks at least 2 miles a day), healthy diet  We will see him again in 12 months, sooner as needed
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
## 2019-09-10 ENCOUNTER — APPOINTMENT (OUTPATIENT)
Dept: LAB | Facility: CLINIC | Age: 79
End: 2019-09-10
Payer: COMMERCIAL

## 2019-09-10 DIAGNOSIS — Z12.5 SCREENING PSA (PROSTATE SPECIFIC ANTIGEN): ICD-10-CM

## 2019-09-10 LAB
ALBUMIN SERPL BCP-MCNC: 3.9 G/DL (ref 3.5–5)
ALP SERPL-CCNC: 52 U/L (ref 46–116)
ALT SERPL W P-5'-P-CCNC: 28 U/L (ref 12–78)
ANION GAP SERPL CALCULATED.3IONS-SCNC: 6 MMOL/L (ref 4–13)
AST SERPL W P-5'-P-CCNC: 20 U/L (ref 5–45)
BACTERIA UR QL AUTO: ABNORMAL /HPF
BILIRUB SERPL-MCNC: 0.74 MG/DL (ref 0.2–1)
BILIRUB UR QL STRIP: NEGATIVE
BUN SERPL-MCNC: 14 MG/DL (ref 5–25)
CALCIUM SERPL-MCNC: 9 MG/DL (ref 8.3–10.1)
CHLORIDE SERPL-SCNC: 109 MMOL/L (ref 100–108)
CHOLEST SERPL-MCNC: 146 MG/DL (ref 50–200)
CLARITY UR: CLEAR
CO2 SERPL-SCNC: 27 MMOL/L (ref 21–32)
COLOR UR: ABNORMAL
CREAT SERPL-MCNC: 0.69 MG/DL (ref 0.6–1.3)
GFR SERPL CREATININE-BSD FRML MDRD: 92 ML/MIN/1.73SQ M
GLUCOSE P FAST SERPL-MCNC: 104 MG/DL (ref 65–99)
GLUCOSE UR STRIP-MCNC: NEGATIVE MG/DL
HDLC SERPL-MCNC: 42 MG/DL (ref 40–60)
HGB UR QL STRIP.AUTO: NEGATIVE
KETONES UR STRIP-MCNC: NEGATIVE MG/DL
LDLC SERPL CALC-MCNC: 92 MG/DL (ref 0–100)
LEUKOCYTE ESTERASE UR QL STRIP: NEGATIVE
MUCOUS THREADS UR QL AUTO: ABNORMAL
NITRITE UR QL STRIP: NEGATIVE
NON-SQ EPI CELLS URNS QL MICRO: ABNORMAL /HPF
NONHDLC SERPL-MCNC: 104 MG/DL
PH UR STRIP.AUTO: 7 [PH]
POTASSIUM SERPL-SCNC: 4 MMOL/L (ref 3.5–5.3)
PROT SERPL-MCNC: 7 G/DL (ref 6.4–8.2)
PROT UR STRIP-MCNC: ABNORMAL MG/DL
PSA SERPL-MCNC: 2.9 NG/ML (ref 0–4)
RBC #/AREA URNS AUTO: ABNORMAL /HPF
SODIUM SERPL-SCNC: 142 MMOL/L (ref 136–145)
SP GR UR STRIP.AUTO: 1.02 (ref 1–1.03)
TRIGL SERPL-MCNC: 60 MG/DL
UROBILINOGEN UR QL STRIP.AUTO: 1 E.U./DL
WBC #/AREA URNS AUTO: ABNORMAL /HPF

## 2019-09-10 PROCEDURE — 80053 COMPREHEN METABOLIC PANEL: CPT | Performed by: FAMILY MEDICINE

## 2019-09-10 PROCEDURE — G0103 PSA SCREENING: HCPCS

## 2019-09-10 PROCEDURE — 81001 URINALYSIS AUTO W/SCOPE: CPT | Performed by: FAMILY MEDICINE

## 2019-09-10 PROCEDURE — 80061 LIPID PANEL: CPT | Performed by: FAMILY MEDICINE

## 2019-09-10 PROCEDURE — 36415 COLL VENOUS BLD VENIPUNCTURE: CPT

## 2019-12-05 ENCOUNTER — IMMUNIZATIONS (OUTPATIENT)
Dept: FAMILY MEDICINE CLINIC | Facility: CLINIC | Age: 79
End: 2019-12-05
Payer: COMMERCIAL

## 2019-12-05 DIAGNOSIS — Z23 NEED FOR INFLUENZA VACCINATION: Primary | ICD-10-CM

## 2019-12-05 PROCEDURE — G0008 ADMIN INFLUENZA VIRUS VAC: HCPCS

## 2019-12-05 PROCEDURE — 90662 IIV NO PRSV INCREASED AG IM: CPT

## 2020-04-01 ENCOUNTER — TELEPHONE (OUTPATIENT)
Dept: FAMILY MEDICINE CLINIC | Facility: CLINIC | Age: 80
End: 2020-04-01

## 2020-09-01 PROBLEM — K62.5 RECTAL BLEEDING: Status: ACTIVE | Noted: 2020-03-17

## 2020-09-30 PROBLEM — K62.5 RECTAL BLEEDING: Status: RESOLVED | Noted: 2020-03-17 | Resolved: 2020-09-30

## 2020-09-30 RX ORDER — LATANOPROST 50 UG/ML
1 SOLUTION/ DROPS OPHTHALMIC EVERY EVENING
COMMUNITY

## 2020-10-01 ENCOUNTER — OFFICE VISIT (OUTPATIENT)
Dept: FAMILY MEDICINE CLINIC | Facility: CLINIC | Age: 80
End: 2020-10-01
Payer: COMMERCIAL

## 2020-10-01 VITALS
OXYGEN SATURATION: 96 % | WEIGHT: 186 LBS | HEIGHT: 70 IN | DIASTOLIC BLOOD PRESSURE: 78 MMHG | SYSTOLIC BLOOD PRESSURE: 136 MMHG | BODY MASS INDEX: 26.63 KG/M2 | HEART RATE: 70 BPM | TEMPERATURE: 98 F

## 2020-10-01 DIAGNOSIS — E78.00 HYPERCHOLESTEREMIA: ICD-10-CM

## 2020-10-01 DIAGNOSIS — D50.0 BLOOD LOSS ANEMIA: ICD-10-CM

## 2020-10-01 DIAGNOSIS — I10 ESSENTIAL HYPERTENSION: ICD-10-CM

## 2020-10-01 DIAGNOSIS — Z00.00 MEDICARE ANNUAL WELLNESS VISIT, SUBSEQUENT: Primary | ICD-10-CM

## 2020-10-01 DIAGNOSIS — R63.4 WEIGHT LOSS: ICD-10-CM

## 2020-10-01 PROCEDURE — 1160F RVW MEDS BY RX/DR IN RCRD: CPT | Performed by: FAMILY MEDICINE

## 2020-10-01 PROCEDURE — G0439 PPPS, SUBSEQ VISIT: HCPCS | Performed by: FAMILY MEDICINE

## 2020-10-01 PROCEDURE — 3078F DIAST BP <80 MM HG: CPT | Performed by: FAMILY MEDICINE

## 2020-10-01 PROCEDURE — 1125F AMNT PAIN NOTED PAIN PRSNT: CPT | Performed by: FAMILY MEDICINE

## 2020-10-01 PROCEDURE — 99213 OFFICE O/P EST LOW 20 MIN: CPT | Performed by: FAMILY MEDICINE

## 2020-10-01 PROCEDURE — 1036F TOBACCO NON-USER: CPT | Performed by: FAMILY MEDICINE

## 2020-10-01 PROCEDURE — 1170F FXNL STATUS ASSESSED: CPT | Performed by: FAMILY MEDICINE

## 2020-10-01 PROCEDURE — 3725F SCREEN DEPRESSION PERFORMED: CPT | Performed by: FAMILY MEDICINE

## 2020-10-01 RX ORDER — HYDROCHLOROTHIAZIDE 25 MG/1
12.5 TABLET ORAL DAILY
Qty: 45 TABLET | Refills: 3 | Status: SHIPPED | OUTPATIENT
Start: 2020-10-01 | End: 2021-07-02

## 2020-10-01 RX ORDER — SIMVASTATIN 5 MG
5 TABLET ORAL DAILY
Qty: 90 TABLET | Refills: 3 | Status: SHIPPED | OUTPATIENT
Start: 2020-10-01 | End: 2021-09-30

## 2020-10-09 ENCOUNTER — APPOINTMENT (OUTPATIENT)
Dept: LAB | Facility: CLINIC | Age: 80
End: 2020-10-09
Payer: COMMERCIAL

## 2020-10-09 ENCOUNTER — IMMUNIZATIONS (OUTPATIENT)
Dept: FAMILY MEDICINE CLINIC | Facility: CLINIC | Age: 80
End: 2020-10-09
Payer: COMMERCIAL

## 2020-10-09 VITALS — TEMPERATURE: 97.5 F

## 2020-10-09 DIAGNOSIS — Z23 NEED FOR INFLUENZA VACCINATION: Primary | ICD-10-CM

## 2020-10-09 LAB
ALBUMIN SERPL BCP-MCNC: 3.8 G/DL (ref 3.5–5)
ALP SERPL-CCNC: 57 U/L (ref 46–116)
ALT SERPL W P-5'-P-CCNC: 24 U/L (ref 12–78)
ANION GAP SERPL CALCULATED.3IONS-SCNC: 3 MMOL/L (ref 4–13)
AST SERPL W P-5'-P-CCNC: 16 U/L (ref 5–45)
BILIRUB SERPL-MCNC: 0.58 MG/DL (ref 0.2–1)
BUN SERPL-MCNC: 15 MG/DL (ref 5–25)
CALCIUM SERPL-MCNC: 9.3 MG/DL (ref 8.3–10.1)
CHLORIDE SERPL-SCNC: 108 MMOL/L (ref 100–108)
CHOLEST SERPL-MCNC: 165 MG/DL (ref 50–200)
CO2 SERPL-SCNC: 29 MMOL/L (ref 21–32)
CREAT SERPL-MCNC: 0.72 MG/DL (ref 0.6–1.3)
ERYTHROCYTE [DISTWIDTH] IN BLOOD BY AUTOMATED COUNT: 15.6 % (ref 11.6–15.1)
GFR SERPL CREATININE-BSD FRML MDRD: 89 ML/MIN/1.73SQ M
GLUCOSE P FAST SERPL-MCNC: 96 MG/DL (ref 65–99)
HCT VFR BLD AUTO: 39.2 % (ref 36.5–49.3)
HDLC SERPL-MCNC: 53 MG/DL
HGB BLD-MCNC: 12 G/DL (ref 12–17)
LDLC SERPL CALC-MCNC: 102 MG/DL (ref 0–100)
MCH RBC QN AUTO: 25.5 PG (ref 26.8–34.3)
MCHC RBC AUTO-ENTMCNC: 30.6 G/DL (ref 31.4–37.4)
MCV RBC AUTO: 83 FL (ref 82–98)
NONHDLC SERPL-MCNC: 112 MG/DL
PLATELET # BLD AUTO: 229 THOUSANDS/UL (ref 149–390)
PMV BLD AUTO: 11.2 FL (ref 8.9–12.7)
POTASSIUM SERPL-SCNC: 3.8 MMOL/L (ref 3.5–5.3)
PROT SERPL-MCNC: 7.3 G/DL (ref 6.4–8.2)
RBC # BLD AUTO: 4.7 MILLION/UL (ref 3.88–5.62)
SODIUM SERPL-SCNC: 140 MMOL/L (ref 136–145)
TRIGL SERPL-MCNC: 52 MG/DL
WBC # BLD AUTO: 7.18 THOUSAND/UL (ref 4.31–10.16)

## 2020-10-09 PROCEDURE — 80053 COMPREHEN METABOLIC PANEL: CPT | Performed by: FAMILY MEDICINE

## 2020-10-09 PROCEDURE — G0008 ADMIN INFLUENZA VIRUS VAC: HCPCS

## 2020-10-09 PROCEDURE — 90662 IIV NO PRSV INCREASED AG IM: CPT

## 2020-10-09 PROCEDURE — 80061 LIPID PANEL: CPT | Performed by: FAMILY MEDICINE

## 2020-10-09 PROCEDURE — 36415 COLL VENOUS BLD VENIPUNCTURE: CPT | Performed by: FAMILY MEDICINE

## 2020-10-09 PROCEDURE — 85027 COMPLETE CBC AUTOMATED: CPT | Performed by: FAMILY MEDICINE

## 2020-10-24 ENCOUNTER — NURSE TRIAGE (OUTPATIENT)
Dept: OTHER | Facility: OTHER | Age: 80
End: 2020-10-24

## 2020-10-26 ENCOUNTER — TELEPHONE (OUTPATIENT)
Dept: FAMILY MEDICINE CLINIC | Facility: CLINIC | Age: 80
End: 2020-10-26

## 2020-10-27 ENCOUNTER — OFFICE VISIT (OUTPATIENT)
Dept: FAMILY MEDICINE CLINIC | Facility: CLINIC | Age: 80
End: 2020-10-27
Payer: COMMERCIAL

## 2020-10-27 VITALS
TEMPERATURE: 97.6 F | SYSTOLIC BLOOD PRESSURE: 148 MMHG | HEART RATE: 71 BPM | OXYGEN SATURATION: 96 % | BODY MASS INDEX: 26.63 KG/M2 | DIASTOLIC BLOOD PRESSURE: 72 MMHG | HEIGHT: 70 IN | WEIGHT: 186 LBS

## 2020-10-27 DIAGNOSIS — L30.9 DERMATITIS: Primary | ICD-10-CM

## 2020-10-27 PROCEDURE — 3078F DIAST BP <80 MM HG: CPT | Performed by: FAMILY MEDICINE

## 2020-10-27 PROCEDURE — 3077F SYST BP >= 140 MM HG: CPT | Performed by: FAMILY MEDICINE

## 2020-10-27 PROCEDURE — 99212 OFFICE O/P EST SF 10 MIN: CPT | Performed by: FAMILY MEDICINE

## 2020-10-27 PROCEDURE — 1160F RVW MEDS BY RX/DR IN RCRD: CPT | Performed by: FAMILY MEDICINE

## 2020-11-12 ENCOUNTER — VBI (OUTPATIENT)
Dept: ADMINISTRATIVE | Facility: OTHER | Age: 80
End: 2020-11-12

## 2020-12-14 ENCOUNTER — VBI (OUTPATIENT)
Dept: ADMINISTRATIVE | Facility: OTHER | Age: 80
End: 2020-12-14

## 2021-03-26 NOTE — PROGRESS NOTES
Assessment & Plan:  See other note today regarding provider information     H40 9  Unspecified glaucoma   I have evaluated the patient and found the condition to be: Stable  I have evaluated the patient and: Recommended continue with same treatment plan  The patient should continue treatment and follow-up with: Sees eye doctor    I10  Essential hypertension   I have evaluated the patient and found the condition to be: Stable  I have evaluated the patient and: Recommended continue with same treatment plan  The patient should continue treatment and follow-up with: See plan with other note today    E78 00  Hypercholesterolemia   I have evaluated the patient and found the condition to be:  Stable  I have evaluated the patient and: Recommended continue with same treatment plan  The patient should continue treatment and follow-up with: Continue medication as is, continue healthy diet         Subjective:     Patient ID: Emmaline Nageotte is a 68 y o  male     Chief Complaint   Patient presents with    Follow-up      HPI    Review of Systems    Objective:      /80 (BP Location: Right arm, Patient Position: Sitting, Cuff Size: Standard)   Pulse 75   Temp 97 5 °F (36 4 °C)   Ht 5' 10" (1 778 m)   Wt 84 4 kg (186 lb)   SpO2 97%   BMI 26 69 kg/m²     Problem List Items Addressed This Visit        Cardiovascular and Mediastinum    Essential hypertension       Other    Glaucoma    Hypercholesteremia      Other Visit Diagnoses     Body mass index (BMI) 26 0-26 9, adult    -  Primary          Physical Exam

## 2021-04-01 ENCOUNTER — OFFICE VISIT (OUTPATIENT)
Dept: FAMILY MEDICINE CLINIC | Facility: CLINIC | Age: 81
End: 2021-04-01
Payer: COMMERCIAL

## 2021-04-01 VITALS
TEMPERATURE: 97.5 F | WEIGHT: 186 LBS | DIASTOLIC BLOOD PRESSURE: 80 MMHG | BODY MASS INDEX: 26.63 KG/M2 | HEART RATE: 75 BPM | SYSTOLIC BLOOD PRESSURE: 140 MMHG | OXYGEN SATURATION: 97 % | HEIGHT: 70 IN

## 2021-04-01 DIAGNOSIS — I10 ESSENTIAL HYPERTENSION: ICD-10-CM

## 2021-04-01 DIAGNOSIS — E78.00 HYPERCHOLESTEREMIA: ICD-10-CM

## 2021-04-01 DIAGNOSIS — H40.9 GLAUCOMA, UNSPECIFIED GLAUCOMA TYPE, UNSPECIFIED LATERALITY: ICD-10-CM

## 2021-04-01 PROCEDURE — 99213 OFFICE O/P EST LOW 20 MIN: CPT | Performed by: FAMILY MEDICINE

## 2021-04-01 PROCEDURE — T1015 CLINIC SERVICE: HCPCS | Performed by: FAMILY MEDICINE

## 2021-04-01 NOTE — PATIENT INSTRUCTIONS
He is feeling well  Blood pressure at home has been excellent around 128/66, blood pressure here today after sitting is 140/80  For now he would just continue hydrochlorothiazide 12 5 mg daily  Previously dropped 20 lb, weight has been stable now for the past 6 months  Continue with healthy diet, limit salt  October blood work showed CMP to be unremarkable, glucose 96, creatinine 0 72 with potassium 3 8  Hemoglobin 12  Continue simvastatin 5 mg daily, cholesterol back in October 1 65 with HDL 53,     Does have some left hand pain at times, history of pin left hand, watch for radicular complaints related to neck  We will see him again in 6 months with an annual wellness, we will plan to redo CBC, CMP, lipids, urinalysis at that  Consider TSH  Also, has noted increased urinary frequency, is using diuretic later in the day, I would like him to use that in the morning  PSA  stable 2016 to 2019  No dysuria  Call if worsens      Had colon adenoma 1/2018     He will see Podiatry -   Corn /callus right lateral foot, needs nail care    He does see his eye doctor

## 2021-04-01 NOTE — PROGRESS NOTES
BMI Counseling: Body mass index is 26 69 kg/m²  The BMI is above normal  Nutrition recommendations include moderation in carbohydrate intake  Exercise recommendations include exercising 3-5 times per week  FAMILY PRACTICE OFFICE VISIT  [facility]  [address] [facility] [address]  [place], [place], [address]      NAME: Irvin Rodriguez  AGE: 68 y o  SEX: male  : 1940   MRN: [id number]    DATE: 2021  TIME: 11:12 AM    Assessment and Plan     Problem List Items Addressed This Visit        Cardiovascular and Mediastinum    Essential hypertension       Other    Glaucoma    Hypercholesteremia      Other Visit Diagnoses     Body mass index (BMI) 26 0-26 9, adult    -  Primary          Patient Instructions   He is feeling well  Blood pressure at home has been excellent around 128/66, blood pressure here today after sitting is 140/80  For now he would just continue hydrochlorothiazide 12 5 mg daily  Previously dropped 20 lb, weight has been stable now for the past 6 months  Continue with healthy diet, limit salt  October blood work showed CMP to be unremarkable, glucose 96, creatinine 0 72 with potassium 3 8  Hemoglobin 12  Continue simvastatin 5 mg daily, cholesterol back in  65 with HDL 53,     Does have some left hand pain at times, history of pin left hand, watch for radicular complaints related to neck  We will see him again in 6 months with an annual wellness, we will plan to redo CBC, CMP, lipids, urinalysis at that  Consider TSH  Also, has noted increased urinary frequency, is using diuretic later in the day, I would like him to use that in the morning  PSA  stable  to   No dysuria  Call if worsens      Had colon adenoma 2018     He will see Podiatry -   Corn /callus right lateral foot, needs nail care    He does see his eye doctor        Chief Complaint     Chief Complaint   Patient presents with  Follow-up       History of Present Illness   Scotty Venegas is a 68y o -year-old male who is in today for a routine follow-up, he has been feeling well  Previously dropped 20 lb with watching his diet, weight loss has stabilized, he is feeling well other than a corn/callus right lateral foot along with some left hand pain/paresthesias at times  Does have some left-sided neck pain, no weakness in arm  Does have some urinary frequency, does use diuretic later in the day rather than in the morning     Blood pressure readings at home have been fine  Review of Systems   Review of Systems   Constitutional: Negative for appetite change, fatigue, fever and unexpected weight change  HENT: Negative for sore throat and trouble swallowing  Eyes: Negative for visual disturbance  Respiratory: Negative for cough, chest tightness and shortness of breath  Cardiovascular: Negative for chest pain, palpitations and leg swelling  Gastrointestinal: Negative for abdominal pain, blood in stool, nausea and vomiting  No acid reflux     No change in bowel   Genitourinary: Negative for dysuria and hematuria  Neurological: Negative for dizziness, syncope, weakness, light-headedness and headaches  Hematological: Does not bruise/bleed easily  Psychiatric/Behavioral: Negative for behavioral problems and confusion         Active Problem List     Patient Active Problem List   Diagnosis    Adenomatous polyp of rectum    Allergic rhinitis    Dry mouth    Glaucoma    Hypercholesteremia    Essential hypertension    Nephrolithiasis    Osteoarthritis, knee    Varicose vein of leg       Past Medical History:  Reviewed    Past Surgical History:  Reviewed    Family History:  Reviewed    Social History:  Reviewed    Objective     Vitals:    04/01/21 0852   BP: 140/80   BP Location: Right arm   Patient Position: Sitting   Cuff Size: Standard   Pulse: 75   Temp: 97 5 °F (36 4 °C)   SpO2: 97%   Weight: 84 4 kg (186 lb)   Height: 5' 10" (1 778 m)     Body mass index is 26 69 kg/m²  BP Readings from Last 3 Encounters:   04/01/21 140/80   10/27/20 148/72   10/01/20 136/78       Wt Readings from Last 3 Encounters:   04/01/21 84 4 kg (186 lb)   10/27/20 84 4 kg (186 lb)   10/01/20 84 4 kg (186 lb)       Physical Exam  Constitutional:       General: He is not in acute distress  Appearance: Normal appearance  He is well-developed  He is not ill-appearing  Eyes:      General: No scleral icterus  Cardiovascular:      Rate and Rhythm: Normal rate and regular rhythm  Heart sounds: Normal heart sounds  No murmur  Pulmonary:      Effort: Pulmonary effort is normal  No respiratory distress  Breath sounds: Normal breath sounds  Abdominal:      Palpations: Abdomen is soft  Tenderness: There is no abdominal tenderness  Musculoskeletal:      Right lower leg: No edema  Left lower leg: No edema  Lymphadenopathy:      Cervical: No cervical adenopathy  Neurological:      General: No focal deficit present  Mental Status: He is alert and oriented to person, place, and time  Psychiatric:         Mood and Affect: Mood normal          Behavior: Behavior normal          ALLERGIES:  Allergies   Allergen Reactions    Penicillins      Other reaction(s): Unknown Allergic Reaction       Current Medications     Current Outpatient Medications   Medication Sig Dispense Refill    brimonidine-timolol (COMBIGAN) 0 2-0 5 % Apply to eye      hydrochlorothiazide (HYDRODIURIL) 25 mg tablet Take 0 5 tablets (12 5 mg total) by mouth daily 45 tablet 3    latanoprost (XALATAN) 0 005 % ophthalmic solution Apply 1 drop to eye every evening Left Eye      simvastatin (ZOCOR) 5 MG tablet Take 1 tablet (5 mg total) by mouth daily 90 tablet 3     No current facility-administered medications for this visit  No orders of the defined types were placed in this encounter          Maria Elena Silva DO

## 2021-07-02 DIAGNOSIS — I10 ESSENTIAL HYPERTENSION: ICD-10-CM

## 2021-07-02 RX ORDER — HYDROCHLOROTHIAZIDE 25 MG/1
TABLET ORAL
Qty: 45 TABLET | Refills: 3 | Status: SHIPPED | OUTPATIENT
Start: 2021-07-02 | End: 2022-07-09

## 2021-09-30 DIAGNOSIS — E78.00 HYPERCHOLESTEREMIA: ICD-10-CM

## 2021-09-30 RX ORDER — SIMVASTATIN 5 MG
TABLET ORAL
Qty: 90 TABLET | Refills: 3 | Status: SHIPPED | OUTPATIENT
Start: 2021-09-30

## 2021-10-26 ENCOUNTER — OFFICE VISIT (OUTPATIENT)
Dept: FAMILY MEDICINE CLINIC | Facility: CLINIC | Age: 81
End: 2021-10-26
Payer: COMMERCIAL

## 2021-10-26 VITALS
OXYGEN SATURATION: 96 % | BODY MASS INDEX: 26.77 KG/M2 | HEIGHT: 70 IN | DIASTOLIC BLOOD PRESSURE: 80 MMHG | SYSTOLIC BLOOD PRESSURE: 132 MMHG | WEIGHT: 187 LBS | HEART RATE: 63 BPM | TEMPERATURE: 98 F

## 2021-10-26 DIAGNOSIS — E78.00 HYPERCHOLESTEREMIA: ICD-10-CM

## 2021-10-26 DIAGNOSIS — Z23 NEED FOR INFLUENZA VACCINATION: ICD-10-CM

## 2021-10-26 DIAGNOSIS — N20.0 NEPHROLITHIASIS: ICD-10-CM

## 2021-10-26 DIAGNOSIS — I10 ESSENTIAL HYPERTENSION: ICD-10-CM

## 2021-10-26 DIAGNOSIS — Z00.00 MEDICARE ANNUAL WELLNESS VISIT, SUBSEQUENT: Primary | ICD-10-CM

## 2021-10-26 PROCEDURE — 1160F RVW MEDS BY RX/DR IN RCRD: CPT | Performed by: FAMILY MEDICINE

## 2021-10-26 PROCEDURE — 1036F TOBACCO NON-USER: CPT | Performed by: FAMILY MEDICINE

## 2021-10-26 PROCEDURE — 3725F SCREEN DEPRESSION PERFORMED: CPT | Performed by: FAMILY MEDICINE

## 2021-10-26 PROCEDURE — 1170F FXNL STATUS ASSESSED: CPT | Performed by: FAMILY MEDICINE

## 2021-10-26 PROCEDURE — G0008 ADMIN INFLUENZA VIRUS VAC: HCPCS

## 2021-10-26 PROCEDURE — G0439 PPPS, SUBSEQ VISIT: HCPCS | Performed by: FAMILY MEDICINE

## 2021-10-26 PROCEDURE — 99213 OFFICE O/P EST LOW 20 MIN: CPT | Performed by: FAMILY MEDICINE

## 2021-10-26 PROCEDURE — 3079F DIAST BP 80-89 MM HG: CPT | Performed by: FAMILY MEDICINE

## 2021-10-26 PROCEDURE — 3075F SYST BP GE 130 - 139MM HG: CPT | Performed by: FAMILY MEDICINE

## 2021-10-26 PROCEDURE — 3288F FALL RISK ASSESSMENT DOCD: CPT | Performed by: FAMILY MEDICINE

## 2021-10-26 PROCEDURE — 90662 IIV NO PRSV INCREASED AG IM: CPT

## 2021-11-01 ENCOUNTER — APPOINTMENT (OUTPATIENT)
Dept: LAB | Facility: CLINIC | Age: 81
End: 2021-11-01
Payer: COMMERCIAL

## 2021-11-01 LAB
ALBUMIN SERPL BCP-MCNC: 3.5 G/DL (ref 3.5–5)
ALP SERPL-CCNC: 52 U/L (ref 46–116)
ALT SERPL W P-5'-P-CCNC: 28 U/L (ref 12–78)
ANION GAP SERPL CALCULATED.3IONS-SCNC: 3 MMOL/L (ref 4–13)
AST SERPL W P-5'-P-CCNC: 19 U/L (ref 5–45)
BACTERIA UR QL AUTO: ABNORMAL /HPF
BILIRUB SERPL-MCNC: 0.57 MG/DL (ref 0.2–1)
BILIRUB UR QL STRIP: NEGATIVE
BUN SERPL-MCNC: 13 MG/DL (ref 5–25)
CALCIUM SERPL-MCNC: 9.6 MG/DL (ref 8.3–10.1)
CHLORIDE SERPL-SCNC: 107 MMOL/L (ref 100–108)
CHOLEST SERPL-MCNC: 153 MG/DL (ref 50–200)
CLARITY UR: CLEAR
CO2 SERPL-SCNC: 29 MMOL/L (ref 21–32)
COLOR UR: ABNORMAL
CREAT SERPL-MCNC: 0.73 MG/DL (ref 0.6–1.3)
ERYTHROCYTE [DISTWIDTH] IN BLOOD BY AUTOMATED COUNT: 13.6 % (ref 11.6–15.1)
GFR SERPL CREATININE-BSD FRML MDRD: 88 ML/MIN/1.73SQ M
GLUCOSE P FAST SERPL-MCNC: 104 MG/DL (ref 65–99)
GLUCOSE UR STRIP-MCNC: NEGATIVE MG/DL
HCT VFR BLD AUTO: 41.1 % (ref 36.5–49.3)
HDLC SERPL-MCNC: 49 MG/DL
HGB BLD-MCNC: 13.1 G/DL (ref 12–17)
HGB UR QL STRIP.AUTO: NEGATIVE
KETONES UR STRIP-MCNC: NEGATIVE MG/DL
LDLC SERPL CALC-MCNC: 93 MG/DL (ref 0–100)
LEUKOCYTE ESTERASE UR QL STRIP: NEGATIVE
MCH RBC QN AUTO: 27.6 PG (ref 26.8–34.3)
MCHC RBC AUTO-ENTMCNC: 31.9 G/DL (ref 31.4–37.4)
MCV RBC AUTO: 87 FL (ref 82–98)
MUCOUS THREADS UR QL AUTO: ABNORMAL
NITRITE UR QL STRIP: NEGATIVE
NON-SQ EPI CELLS URNS QL MICRO: ABNORMAL /HPF
NONHDLC SERPL-MCNC: 104 MG/DL
PH UR STRIP.AUTO: 6.5 [PH]
PLATELET # BLD AUTO: 217 THOUSANDS/UL (ref 149–390)
PMV BLD AUTO: 10.4 FL (ref 8.9–12.7)
POTASSIUM SERPL-SCNC: 3.9 MMOL/L (ref 3.5–5.3)
PROT SERPL-MCNC: 6.9 G/DL (ref 6.4–8.2)
PROT UR STRIP-MCNC: NEGATIVE MG/DL
RBC # BLD AUTO: 4.74 MILLION/UL (ref 3.88–5.62)
RBC #/AREA URNS AUTO: ABNORMAL /HPF
SODIUM SERPL-SCNC: 139 MMOL/L (ref 136–145)
SP GR UR STRIP.AUTO: 1.02 (ref 1–1.03)
TRIGL SERPL-MCNC: 54 MG/DL
UROBILINOGEN UR QL STRIP.AUTO: 1 E.U./DL
WBC # BLD AUTO: 7.41 THOUSAND/UL (ref 4.31–10.16)
WBC #/AREA URNS AUTO: ABNORMAL /HPF

## 2021-11-01 PROCEDURE — 85027 COMPLETE CBC AUTOMATED: CPT | Performed by: FAMILY MEDICINE

## 2021-11-01 PROCEDURE — 80053 COMPREHEN METABOLIC PANEL: CPT | Performed by: FAMILY MEDICINE

## 2021-11-01 PROCEDURE — 81001 URINALYSIS AUTO W/SCOPE: CPT | Performed by: FAMILY MEDICINE

## 2021-11-01 PROCEDURE — 80061 LIPID PANEL: CPT | Performed by: FAMILY MEDICINE

## 2021-11-01 PROCEDURE — 36415 COLL VENOUS BLD VENIPUNCTURE: CPT | Performed by: FAMILY MEDICINE

## 2022-04-19 ENCOUNTER — RA CDI HCC (OUTPATIENT)
Dept: OTHER | Facility: HOSPITAL | Age: 82
End: 2022-04-19

## 2022-04-19 NOTE — PROGRESS NOTES
[facility]  coding opportunities       Chart reviewed, no opportunity found: CHART REVIEWED, NO OPPORTUNITY FOUND        Patients Insurance     Medicare Insurance: [employer] [facility] Advantage

## 2022-04-26 ENCOUNTER — OFFICE VISIT (OUTPATIENT)
Dept: FAMILY MEDICINE CLINIC | Facility: CLINIC | Age: 82
End: 2022-04-26
Payer: COMMERCIAL

## 2022-04-26 VITALS
WEIGHT: 178 LBS | SYSTOLIC BLOOD PRESSURE: 120 MMHG | DIASTOLIC BLOOD PRESSURE: 64 MMHG | HEART RATE: 66 BPM | HEIGHT: 70 IN | OXYGEN SATURATION: 96 % | TEMPERATURE: 97.7 F | BODY MASS INDEX: 25.48 KG/M2

## 2022-04-26 DIAGNOSIS — E78.00 HYPERCHOLESTEREMIA: ICD-10-CM

## 2022-04-26 DIAGNOSIS — R63.4 WEIGHT LOSS: ICD-10-CM

## 2022-04-26 DIAGNOSIS — I10 ESSENTIAL HYPERTENSION: Primary | ICD-10-CM

## 2022-04-26 DIAGNOSIS — R53.83 FATIGUE, UNSPECIFIED TYPE: ICD-10-CM

## 2022-04-26 PROCEDURE — 3725F SCREEN DEPRESSION PERFORMED: CPT | Performed by: FAMILY MEDICINE

## 2022-04-26 PROCEDURE — 3074F SYST BP LT 130 MM HG: CPT | Performed by: FAMILY MEDICINE

## 2022-04-26 PROCEDURE — 1036F TOBACCO NON-USER: CPT | Performed by: FAMILY MEDICINE

## 2022-04-26 PROCEDURE — 3078F DIAST BP <80 MM HG: CPT | Performed by: FAMILY MEDICINE

## 2022-04-26 PROCEDURE — 1160F RVW MEDS BY RX/DR IN RCRD: CPT | Performed by: FAMILY MEDICINE

## 2022-04-26 PROCEDURE — 99214 OFFICE O/P EST MOD 30 MIN: CPT | Performed by: FAMILY MEDICINE

## 2022-04-26 NOTE — PATIENT INSTRUCTIONS
His blood pressure is excellent here today, 120/64, stay on hydrochlorothiazide 25 mg 1/2 tablet every day  He did change his diet about 2 months ago, eats 1 main meal daily, 2 smaller meals, has cut back on some sweets, does eat cereal in the morning  He has dropped about 8 lb  Does have some postprandial fatigue after dinner in the evening but otherwise has been feeling well  His blood work in November showed CBC, CMP, urinalysis to be fine with cholesterol at 153, HDL 49, LDL 93  Stay on simvastatin 5 mg daily  With fatigue, weight loss he will redo blood work  Last glucose was 104  He has had no issues with kidney stones  He does note occasional radicular symptoms down left arm to hand, just observe  Has had no chest pain, increased palpitations or increased shortness of breath  We will see him again in 6 months with an annual wellness check, call sooner if needed/if notes further weight loss, await blood work  He also did have significant wax left ear canal, partial removal, hearing is improved, he will use over-the-counter wax softener drops

## 2022-04-26 NOTE — PROGRESS NOTES
FAMILY PRACTICE OFFICE VISIT  [facility]  [address]  [address], [place], [address]      NAME: Alba Cheung  AGE: 80 y o  SEX: male  : 1940   MRN: [id number]    DATE: 2022  TIME: 12:00 PM    Assessment and Plan     Problem List Items Addressed This Visit     Hypercholesteremia    Relevant Orders    Comprehensive metabolic panel    Essential hypertension - Primary    Relevant Orders    Comprehensive metabolic panel    TSH, 3rd generation with Free T4 reflex      Other Visit Diagnoses     Weight loss        Relevant Orders    CBC    Comprehensive metabolic panel    TSH, 3rd generation with Free T4 reflex    Fatigue, unspecified type        Relevant Orders    CBC    Comprehensive metabolic panel    TSH, 3rd generation with Free T4 reflex          Patient Instructions   His blood pressure is excellent here today, 120/64, stay on hydrochlorothiazide 25 mg 1/2 tablet every day  He did change his diet about 2 months ago, eats 1 main meal daily, 2 smaller meals, has cut back on some sweets, does eat cereal in the morning  He has dropped about 8 lb  Does have some postprandial fatigue after dinner in the evening but otherwise has been feeling well  His blood work in November showed CBC, CMP, urinalysis to be fine with cholesterol at 153, HDL 49, LDL 93  Stay on simvastatin 5 mg daily  With fatigue, weight loss he will redo blood work  Last glucose was 104  He has had no issues with kidney stones  He does note occasional radicular symptoms down left arm to hand, just observe  Has had no chest pain, increased palpitations or increased shortness of breath  We will see him again in 6 months with an annual wellness check, call sooner if needed/if notes further weight loss, await blood work      He also did have significant wax left ear canal, partial removal, hearing is improved, he will use over-the-counter wax softener drops       Chief Complaint     Chief Complaint   Patient presents with    Follow-up    Cerumen Impaction     left ear for about 1 monyh , hearing problem     Insomnia     for 1 month       History of Present Illness   Medina Callaway is a 80y o -year-old male who is in today for a 6 month check, he relates overall he has been feeling well  He change his diet about 2 months ago, relates he eats 1 main meal, 2 smaller meals, has been eating cereal for breakfast but otherwise has tried to cut down on sweets, has dropped about 8 lb  He notes some occasional tiredness after dinner but otherwise energy is about the same  Is using medication as directed      Review of Systems   Review of Systems   Constitutional: Positive for fatigue and unexpected weight change  Negative for appetite change and fever  HENT: Negative for sore throat and trouble swallowing  Respiratory: Negative for cough, chest tightness, shortness of breath and wheezing  Cardiovascular: Positive for leg swelling (Very mild)  Negative for chest pain and palpitations  Gastrointestinal: Negative for abdominal pain, blood in stool, nausea and vomiting  No acid reflux     No change in bowel   Genitourinary: Negative for dysuria and hematuria  Musculoskeletal:        Notes occasional tenderness suprascapular on left which radiates down arm/paresthesias, tingling into hand at times  Has no chest pain or shortness of breath, no palpitations    Also notes some low back pain at times, no new weakness in legs   Neurological: Negative for dizziness, syncope, light-headedness and headaches  Hematological: Does not bruise/bleed easily  Psychiatric/Behavioral: Negative for behavioral problems and confusion         Active Problem List     Patient Active Problem List   Diagnosis    Adenomatous polyp of rectum    Allergic rhinitis    Dry mouth    Glaucoma    Hypercholesteremia    Essential hypertension    Nephrolithiasis    Osteoarthritis, knee    Varicose vein of leg       Past Medical History:  Reviewed    Past Surgical History:  Reviewed    Family History:  Reviewed    Social History:  Reviewed    Objective     Vitals:    04/26/22 1108   BP: 120/64   BP Location: Left arm   Patient Position: Sitting   Cuff Size: Large   Pulse: 66   Temp: 97 7 °F (36 5 °C)   SpO2: 96%   Weight: 80 7 kg (178 lb)   Height: 5' 10" (1 778 m)     Body mass index is 25 54 kg/m²  BP Readings from Last 3 Encounters:   04/26/22 120/64   10/26/21 132/80   04/01/21 140/80       Wt Readings from Last 3 Encounters:   04/26/22 80 7 kg (178 lb)   10/26/21 84 8 kg (187 lb)   04/01/21 84 4 kg (186 lb)       Physical Exam  Constitutional:       General: He is not in acute distress  Appearance: Normal appearance  He is well-developed  He is not ill-appearing  Eyes:      General: No scleral icterus  Cardiovascular:      Rate and Rhythm: Normal rate and regular rhythm  Heart sounds: Normal heart sounds  No murmur heard  Pulmonary:      Effort: Pulmonary effort is normal  No respiratory distress  Breath sounds: Normal breath sounds  No wheezing, rhonchi or rales  Abdominal:      Palpations: Abdomen is soft  Tenderness: There is no abdominal tenderness  There is no guarding  Musculoskeletal:      Comments: Tr pitting ankles   Lymphadenopathy:      Cervical: No cervical adenopathy  Neurological:      Mental Status: He is alert and oriented to person, place, and time  Mental status is at baseline     Psychiatric:         Mood and Affect: Mood normal          Behavior: Behavior normal          ALLERGIES:  Allergies   Allergen Reactions    Penicillins      Other reaction(s): Unknown Allergic Reaction       Current Medications     Current Outpatient Medications   Medication Sig Dispense Refill    brimonidine-timolol (COMBIGAN) 0 2-0 5 % Apply to eye      hydrochlorothiazide (HYDRODIURIL) 25 mg tablet TAKE 1/2 TABLET BY MOUTH EVERY DAY 45 tablet 3    latanoprost (XALATAN) 0 005 % ophthalmic solution Apply 1 drop to eye every evening Left Eye      simvastatin (ZOCOR) 5 MG tablet TAKE 1 TABLET BY MOUTH EVERY DAY 90 tablet 3     No current facility-administered medications for this visit              Orders Placed This Encounter   Procedures    CBC    Comprehensive metabolic panel    TSH, 3rd generation with Free T4 reflex         Sue Cheek DO

## 2022-04-27 ENCOUNTER — APPOINTMENT (OUTPATIENT)
Dept: LAB | Facility: CLINIC | Age: 82
End: 2022-04-27
Payer: COMMERCIAL

## 2022-04-27 LAB
ALBUMIN SERPL BCP-MCNC: 3.7 G/DL (ref 3.5–5)
ALP SERPL-CCNC: 48 U/L (ref 46–116)
ALT SERPL W P-5'-P-CCNC: 31 U/L (ref 12–78)
ANION GAP SERPL CALCULATED.3IONS-SCNC: 2 MMOL/L (ref 4–13)
AST SERPL W P-5'-P-CCNC: 22 U/L (ref 5–45)
BILIRUB SERPL-MCNC: 0.86 MG/DL (ref 0.2–1)
BUN SERPL-MCNC: 14 MG/DL (ref 5–25)
CALCIUM SERPL-MCNC: 9.4 MG/DL (ref 8.3–10.1)
CHLORIDE SERPL-SCNC: 105 MMOL/L (ref 100–108)
CO2 SERPL-SCNC: 31 MMOL/L (ref 21–32)
CREAT SERPL-MCNC: 0.73 MG/DL (ref 0.6–1.3)
ERYTHROCYTE [DISTWIDTH] IN BLOOD BY AUTOMATED COUNT: 13.5 % (ref 11.6–15.1)
GFR SERPL CREATININE-BSD FRML MDRD: 88 ML/MIN/1.73SQ M
GLUCOSE P FAST SERPL-MCNC: 105 MG/DL (ref 65–99)
HCT VFR BLD AUTO: 41.4 % (ref 36.5–49.3)
HGB BLD-MCNC: 13.3 G/DL (ref 12–17)
MCH RBC QN AUTO: 28.6 PG (ref 26.8–34.3)
MCHC RBC AUTO-ENTMCNC: 32.1 G/DL (ref 31.4–37.4)
MCV RBC AUTO: 89 FL (ref 82–98)
PLATELET # BLD AUTO: 231 THOUSANDS/UL (ref 149–390)
PMV BLD AUTO: 10.6 FL (ref 8.9–12.7)
POTASSIUM SERPL-SCNC: 4.1 MMOL/L (ref 3.5–5.3)
PROT SERPL-MCNC: 7 G/DL (ref 6.4–8.2)
RBC # BLD AUTO: 4.65 MILLION/UL (ref 3.88–5.62)
SODIUM SERPL-SCNC: 138 MMOL/L (ref 136–145)
TSH SERPL DL<=0.05 MIU/L-ACNC: 1.62 UIU/ML (ref 0.45–4.5)
WBC # BLD AUTO: 7.29 THOUSAND/UL (ref 4.31–10.16)

## 2022-04-27 PROCEDURE — 36415 COLL VENOUS BLD VENIPUNCTURE: CPT | Performed by: FAMILY MEDICINE

## 2022-04-27 PROCEDURE — 80053 COMPREHEN METABOLIC PANEL: CPT | Performed by: FAMILY MEDICINE

## 2022-04-27 PROCEDURE — 84443 ASSAY THYROID STIM HORMONE: CPT | Performed by: FAMILY MEDICINE

## 2022-04-27 PROCEDURE — 85027 COMPLETE CBC AUTOMATED: CPT | Performed by: FAMILY MEDICINE

## 2022-07-09 DIAGNOSIS — I10 ESSENTIAL HYPERTENSION: ICD-10-CM

## 2022-07-09 RX ORDER — HYDROCHLOROTHIAZIDE 25 MG/1
TABLET ORAL
Qty: 45 TABLET | Refills: 3 | Status: SHIPPED | OUTPATIENT
Start: 2022-07-09

## 2022-08-25 ENCOUNTER — CLINICAL SUPPORT (OUTPATIENT)
Dept: FAMILY MEDICINE CLINIC | Facility: CLINIC | Age: 82
End: 2022-08-25
Payer: COMMERCIAL

## 2022-08-25 ENCOUNTER — TELEMEDICINE (OUTPATIENT)
Dept: FAMILY MEDICINE CLINIC | Facility: CLINIC | Age: 82
End: 2022-08-25
Payer: COMMERCIAL

## 2022-08-25 ENCOUNTER — TELEPHONE (OUTPATIENT)
Dept: FAMILY MEDICINE CLINIC | Facility: CLINIC | Age: 82
End: 2022-08-25

## 2022-08-25 VITALS — OXYGEN SATURATION: 96 % | TEMPERATURE: 100.1 F

## 2022-08-25 DIAGNOSIS — U07.1 COVID: Primary | ICD-10-CM

## 2022-08-25 DIAGNOSIS — R06.7 SNEEZING: ICD-10-CM

## 2022-08-25 DIAGNOSIS — R09.81 NASAL CONGESTION: Primary | ICD-10-CM

## 2022-08-25 DIAGNOSIS — R05.9 COUGH: ICD-10-CM

## 2022-08-25 LAB
SARS-COV-2 AG UPPER RESP QL IA: POSITIVE
VALID CONTROL: ABNORMAL

## 2022-08-25 PROCEDURE — 87811 SARS-COV-2 COVID19 W/OPTIC: CPT

## 2022-08-25 PROCEDURE — 99213 OFFICE O/P EST LOW 20 MIN: CPT | Performed by: FAMILY MEDICINE

## 2022-08-25 NOTE — TELEPHONE ENCOUNTER
Pt called back the office , states he is returning a phone call from MD     Informed Pt : I will send a note asking MD or his MA to returned the phone call
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
## 2022-10-07 DIAGNOSIS — E78.00 HYPERCHOLESTEREMIA: ICD-10-CM

## 2022-10-07 RX ORDER — SIMVASTATIN 5 MG
TABLET ORAL
Qty: 90 TABLET | Refills: 3 | Status: SHIPPED | OUTPATIENT
Start: 2022-10-07

## 2022-10-21 ENCOUNTER — RA CDI HCC (OUTPATIENT)
Dept: OTHER | Facility: HOSPITAL | Age: 82
End: 2022-10-21

## 2022-10-21 NOTE — PROGRESS NOTES
[facility]  coding opportunities       Chart reviewed, no opportunity found: CHART REVIEWED, NO OPPORTUNITY FOUND        Patients Insurance     Medicare Insurance: [employer] [facility] Advantage

## 2022-10-28 ENCOUNTER — OFFICE VISIT (OUTPATIENT)
Dept: FAMILY MEDICINE CLINIC | Facility: CLINIC | Age: 82
End: 2022-10-28

## 2022-10-28 VITALS
DIASTOLIC BLOOD PRESSURE: 72 MMHG | SYSTOLIC BLOOD PRESSURE: 136 MMHG | OXYGEN SATURATION: 97 % | HEART RATE: 62 BPM | WEIGHT: 174 LBS | HEIGHT: 70 IN | BODY MASS INDEX: 24.91 KG/M2 | TEMPERATURE: 98 F

## 2022-10-28 DIAGNOSIS — Z23 INFLUENZA VACCINE NEEDED: ICD-10-CM

## 2022-10-28 DIAGNOSIS — I10 ESSENTIAL HYPERTENSION: ICD-10-CM

## 2022-10-28 DIAGNOSIS — K40.20 BILATERAL INGUINAL HERNIA WITHOUT OBSTRUCTION OR GANGRENE, RECURRENCE NOT SPECIFIED: ICD-10-CM

## 2022-10-28 DIAGNOSIS — E78.00 HYPERCHOLESTEREMIA: ICD-10-CM

## 2022-10-28 DIAGNOSIS — H69.82 EUSTACHIAN TUBE DYSFUNCTION, LEFT: ICD-10-CM

## 2022-10-28 DIAGNOSIS — H91.92 HEARING LOSS OF LEFT EAR, UNSPECIFIED HEARING LOSS TYPE: ICD-10-CM

## 2022-10-28 DIAGNOSIS — Z00.00 MEDICARE ANNUAL WELLNESS VISIT, SUBSEQUENT: Primary | ICD-10-CM

## 2022-10-28 RX ORDER — FLUTICASONE PROPIONATE 50 MCG
2 SPRAY, SUSPENSION (ML) NASAL DAILY
Qty: 16 G | Refills: 1 | Status: SHIPPED | OUTPATIENT
Start: 2022-10-28
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
## 2022-10-28 NOTE — PATIENT INSTRUCTIONS
Reviewed health history along with medications  He is doing well overall other than :    -he does have large inguinal hernias especially on the right, he wishes to observe at present, if he does note increased pain he needs re-evaluated, consider surgical evaluation  -he does have blocked hearing on left, left maxillary congestion, has been present few months  Can use Flonase/fluticasone nasal 2 sprays each nostril daily for the next few weeks, can continue with nasal saline  He can see ENT for evaluation, referral was placed  His blood pressure today is 136/72 after sitting, home blood pressure readings have been around 128/78- continue hydrochlorothiazide 25 mg 1/2 tablet daily  We did review previous blood work, April CBC/CMP/ TSH were fine  He is up to date with Lipids -  Chol 11/21 was 153 w HDL 49, LDL 93 - continue simvastatin 5 mg daily  Redo lipids next yr  He is up to date with Diabetes screening  April Glucose 105     Immunization History   Administered Date(s) Administered    COVID-19 PFIZER VACCINE 0 3 ML IM 02/26/2021, 03/19/2021, 12/15/2021    Influenza Split High Dose Preservative Free IM 11/09/2015, 01/30/2017, 11/02/2017    Influenza, high dose seasonal 0 7 mL 11/12/2018, 12/05/2019, 10/09/2020, 10/26/2021    Pneumococcal Conjugate 13-Valent 01/04/2016    Pneumococcal Polysaccharide PPV23 08/22/2017    Td (adult), adsorbed 03/02/2009    Tdap 11/15/2016     Discussed Vaccines,    Pneumovax  is up to date  He does do yearly Flu shot  Given today  Tdap/tetanus shot is up to date  (done every 10 yrs for superficial cuts, every 5 yrs for deep wounds)   Can also look into coverage for shingles shot, Shingrix  Can do that at pharmacy  Covid vaccine received  Had Covid back in August       Was never a smoker      Regarding Colon Cancer screening, routine screening not indicated, he did have colonoscopy with adenoma in January of 2018, hold off on redo       Discussed pros and cons regarding Prostate Cancer screening,   PSA blood test not indicated-previous PSA was stable from 2016 through 2019  He does not have a  "LIVING WILL"   "FIVE WISHES" handout was discussed and given to fill out at home previously     Glaucoma screening is up-to-date     Discussed importance of routine exercise, healthy diet  We will see him again in 6 months, sooner as needed  Redo blood work at follow-up, CBC, CMP, lipids

## 2022-11-20 DIAGNOSIS — H69.82 EUSTACHIAN TUBE DYSFUNCTION, LEFT: ICD-10-CM

## 2022-11-20 RX ORDER — FLUTICASONE PROPIONATE 50 MCG
SPRAY, SUSPENSION (ML) NASAL
Qty: 48 ML | Refills: 1 | Status: SHIPPED | OUTPATIENT
Start: 2022-11-20

## 2022-12-11 DIAGNOSIS — E78.00 HYPERCHOLESTEREMIA: ICD-10-CM

## 2022-12-11 NOTE — TELEPHONE ENCOUNTER
Irrigated wound with normal saline.   Medication Refill Request     Name simvastatin (ZOCOR) 5 MG tablet  Dose/Frequency 1 daily  Quantity 90 tablets  Verified pharmacy   [x]  Verified ordering Provider   [x]  Does patient have enough for the next 3 days?  Yes [x] No []

## 2022-12-12 RX ORDER — SIMVASTATIN 5 MG
5 TABLET ORAL DAILY
Qty: 90 TABLET | Refills: 0 | Status: SHIPPED | OUTPATIENT
Start: 2022-12-12

## 2023-03-04 DIAGNOSIS — H69.82 EUSTACHIAN TUBE DYSFUNCTION, LEFT: ICD-10-CM

## 2023-03-04 RX ORDER — FLUTICASONE PROPIONATE 50 MCG
SPRAY, SUSPENSION (ML) NASAL
Qty: 48 ML | Refills: 2 | Status: SHIPPED | OUTPATIENT
Start: 2023-03-04

## 2023-04-28 ENCOUNTER — OFFICE VISIT (OUTPATIENT)
Dept: FAMILY MEDICINE CLINIC | Facility: CLINIC | Age: 83
End: 2023-04-28

## 2023-04-28 VITALS
HEART RATE: 66 BPM | HEIGHT: 70 IN | BODY MASS INDEX: 25.48 KG/M2 | DIASTOLIC BLOOD PRESSURE: 78 MMHG | OXYGEN SATURATION: 95 % | TEMPERATURE: 98 F | SYSTOLIC BLOOD PRESSURE: 144 MMHG | WEIGHT: 178 LBS

## 2023-04-28 DIAGNOSIS — K40.20 BILATERAL INGUINAL HERNIA WITHOUT OBSTRUCTION OR GANGRENE, RECURRENCE NOT SPECIFIED: ICD-10-CM

## 2023-04-28 DIAGNOSIS — H69.82 DYSFUNCTION OF LEFT EUSTACHIAN TUBE: ICD-10-CM

## 2023-04-28 DIAGNOSIS — I10 ESSENTIAL HYPERTENSION: Primary | ICD-10-CM

## 2023-04-28 DIAGNOSIS — E78.00 HYPERCHOLESTEREMIA: ICD-10-CM

## 2023-04-28 PROBLEM — H69.92 DYSFUNCTION OF LEFT EUSTACHIAN TUBE: Status: ACTIVE | Noted: 2023-04-28

## 2023-04-28 RX ORDER — SIMVASTATIN 5 MG
5 TABLET ORAL DAILY
Qty: 90 TABLET | Refills: 3 | Status: CANCELLED | OUTPATIENT
Start: 2023-04-28

## 2023-04-28 NOTE — PROGRESS NOTES
FAMILY PRACTICE OFFICE VISIT  [facility]  [address] [facility] [address]  [address] [place], [place], [address]      NAME: Nessa Fox  AGE: [de-identified] y o  SEX: male  : 1940   MRN: [id number]    DATE: 2023  TIME: 10:20 AM    Assessment and Plan     Problem List Items Addressed This Visit     Bilateral inguinal hernia    Essential hypertension - Primary    Dysfunction of left eustachian tube    Hypercholesteremia       Patient Instructions   Blood pressure today after sitting is 144/78, home readings have been lower than this at 124/68 range  He will continue hydrochlorothiazide 25 mg 1/2 tablet daily  Has trace swelling left lower extremity, otherwise exam unremarkable  Stay on simvastatin 5 mg daily  We did review recent blood work, CBC, CMP looked okay, cholesterol 149 with HDL 48, LDL 90, TSH was okay 1 year ago  Glucose 94, hemoglobin 13 4, creatinine 0 63 with potassium 4 1  Wax removed left ear canal, hearing has been improved on left with using Flonase, continue as is  We will see him again in 6 months with his annual wellness check, call sooner if needed  As before he is just observing regarding inguinal hernia  Chief Complaint     Chief Complaint   Patient presents with   • Follow-up       History of Present Illness   Nessa Fox is a [de-identified]y o -year-old male who is seen for routine follow-up, he has been feeling well      Review of Systems   Review of Systems   Constitutional: Negative for appetite change, fatigue, fever and unexpected weight change  HENT: Negative for sore throat and trouble swallowing  Has noted improvement with using Flonase   Respiratory: Negative for cough, chest tightness and shortness of breath  Cardiovascular: Negative for chest pain, palpitations and leg swelling  Gastrointestinal: Negative for abdominal pain, blood in stool, nausea and vomiting          No acid reflux     No change in "bowel, no pain associated with inguinal hernias   Genitourinary: Negative for dysuria and hematuria  Neurological: Negative for dizziness, syncope, light-headedness and headaches  Psychiatric/Behavioral: Negative for behavioral problems and confusion  Active Problem List     Patient Active Problem List   Diagnosis   • Adenomatous polyp of rectum   • Allergic rhinitis   • Dry mouth   • Glaucoma   • Hypercholesteremia   • Essential hypertension   • Nephrolithiasis   • Osteoarthritis, knee   • Varicose vein of leg   • Bilateral inguinal hernia   • Dysfunction of left eustachian tube       Past Medical History:  Reviewed    Past Surgical History:  Reviewed    Family History:  Reviewed    Social History:  Reviewed    Objective     Vitals:    04/28/23 0928   BP: 144/78   BP Location: Left arm   Patient Position: Sitting   Cuff Size: Large   Pulse: 66   Temp: 98 °F (36 7 °C)   SpO2: 95%   Weight: 80 7 kg (178 lb)   Height: 5' 10\" (1 778 m)     Body mass index is 25 54 kg/m²  BP Readings from Last 3 Encounters:   04/28/23 144/78   10/28/22 136/72   04/26/22 120/64       Wt Readings from Last 3 Encounters:   04/28/23 80 7 kg (178 lb)   10/28/22 78 9 kg (174 lb)   04/26/22 80 7 kg (178 lb)       Physical Exam  Constitutional:       General: He is not in acute distress  Appearance: Normal appearance  He is well-developed  He is not ill-appearing  HENT:      Right Ear: Tympanic membrane normal       Ears:      Comments: Wax removed  Eyes:      General: No scleral icterus  Cardiovascular:      Rate and Rhythm: Normal rate and regular rhythm  Heart sounds: Normal heart sounds  No murmur heard  Pulmonary:      Effort: Pulmonary effort is normal  No respiratory distress  Breath sounds: Normal breath sounds  No wheezing, rhonchi or rales  Abdominal:      Tenderness: There is no abdominal tenderness  Musculoskeletal:      Right lower leg: No edema  Left lower leg: Edema (tr pitting) present   " Lymphadenopathy:      Cervical: No cervical adenopathy  Skin:     Coloration: Skin is not jaundiced  Neurological:      Mental Status: He is alert  Mental status is at baseline  Psychiatric:         Mood and Affect: Mood normal          Behavior: Behavior normal          ALLERGIES:  Allergies   Allergen Reactions   • Penicillins      Other reaction(s): Unknown Allergic Reaction       Current Medications     Current Outpatient Medications   Medication Sig Dispense Refill   • fluticasone (FLONASE) 50 mcg/act nasal spray SPRAY 2 SPRAYS INTO EACH NOSTRIL EVERY DAY 48 mL 2   • hydrochlorothiazide (HYDRODIURIL) 25 mg tablet TAKE 1/2 TABLET BY MOUTH EVERY DAY 45 tablet 3   • latanoprost (XALATAN) 0 005 % ophthalmic solution Apply 1 drop to eye every evening Left Eye     • simvastatin (ZOCOR) 5 MG tablet TAKE 1 TABLET (5 MG TOTAL) BY MOUTH DAILY  90 tablet 3   • timolol (BETIMOL) 0 5 % ophthalmic solution 1 drop 2 (two) times a day       No current facility-administered medications for this visit  No orders of the defined types were placed in this encounter          Janelle Harp DO

## 2023-04-28 NOTE — PATIENT INSTRUCTIONS
Blood pressure today after sitting is 144/78, home readings have been lower than this at 124/68 range  He will continue hydrochlorothiazide 25 mg 1/2 tablet daily  Has trace swelling left lower extremity, otherwise exam unremarkable  Stay on simvastatin 5 mg daily  We did review recent blood work, CBC, CMP looked okay, cholesterol 149 with HDL 48, LDL 90, TSH was okay 1 year ago  Glucose 94, hemoglobin 13 4, creatinine 0 63 with potassium 4 1  Wax removed left ear canal, hearing has been improved on left with using Flonase, continue as is  We will see him again in 6 months with his annual wellness check, call sooner if needed  As before he is just observing regarding inguinal hernia

## 2023-07-13 DIAGNOSIS — I10 ESSENTIAL HYPERTENSION: ICD-10-CM

## 2023-07-13 RX ORDER — HYDROCHLOROTHIAZIDE 25 MG/1
TABLET ORAL
Qty: 45 TABLET | Refills: 3 | Status: SHIPPED | OUTPATIENT
Start: 2023-07-13

## 2023-10-24 ENCOUNTER — RA CDI HCC (OUTPATIENT)
Dept: OTHER | Facility: HOSPITAL | Age: 83
End: 2023-10-24

## 2023-10-24 NOTE — PROGRESS NOTES
[address] [facility] coding opportunities       Chart reviewed, no opportunity found: CHART REVIEWED, NO OPPORTUNITY FOUND        Patients Insurance     Medicare Insurance: [employer]

## 2023-10-31 ENCOUNTER — TELEPHONE (OUTPATIENT)
Dept: OTHER | Facility: OTHER | Age: 83
End: 2023-10-31

## 2023-10-31 NOTE — TELEPHONE ENCOUNTER
Patient is calling regarding cancelling an appointment.     Date/Time: 10/31/2023 10:00 am    Patient was rescheduled: YES [] NO [x]    Patient requesting call back to reschedule: YES [x] NO []

## 2023-10-31 NOTE — TELEPHONE ENCOUNTER
Called pt and rescheduled appt for Nov. 27, 2023 @ 2:00 PM Pt had A1C and CMP drawn at [facility] on 3/4/21. Entered for MT to review.

## 2023-11-27 ENCOUNTER — OFFICE VISIT (OUTPATIENT)
Dept: FAMILY MEDICINE CLINIC | Facility: CLINIC | Age: 83
End: 2023-11-27
Payer: COMMERCIAL

## 2023-11-27 VITALS
HEART RATE: 68 BPM | SYSTOLIC BLOOD PRESSURE: 136 MMHG | BODY MASS INDEX: 26.54 KG/M2 | HEIGHT: 70 IN | DIASTOLIC BLOOD PRESSURE: 80 MMHG | WEIGHT: 185.4 LBS

## 2023-11-27 DIAGNOSIS — Z23 INFLUENZA VACCINE NEEDED: ICD-10-CM

## 2023-11-27 DIAGNOSIS — K40.20 BILATERAL INGUINAL HERNIA WITHOUT OBSTRUCTION OR GANGRENE, RECURRENCE NOT SPECIFIED: ICD-10-CM

## 2023-11-27 DIAGNOSIS — I10 ESSENTIAL HYPERTENSION: ICD-10-CM

## 2023-11-27 DIAGNOSIS — E78.00 HYPERCHOLESTEREMIA: ICD-10-CM

## 2023-11-27 DIAGNOSIS — Z00.00 MEDICARE ANNUAL WELLNESS VISIT, SUBSEQUENT: Primary | ICD-10-CM

## 2023-11-27 PROCEDURE — 99213 OFFICE O/P EST LOW 20 MIN: CPT | Performed by: FAMILY MEDICINE

## 2023-11-27 PROCEDURE — G0008 ADMIN INFLUENZA VIRUS VAC: HCPCS

## 2023-11-27 PROCEDURE — G0439 PPPS, SUBSEQ VISIT: HCPCS | Performed by: FAMILY MEDICINE

## 2023-11-27 PROCEDURE — 90662 IIV NO PRSV INCREASED AG IM: CPT
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
## 2023-11-27 NOTE — PATIENT INSTRUCTIONS
Reviewed health history along with medications. Overall he is doing well here today, blood pressure 136/80 after sitting, he will continue hydrochlorothiazide 25 mg 1/2 tablet daily as is    We did review previous blood work, back in April CBC, CMP were fine, creatinine 0.63. He is up to date with Lipid screening. Cholesterol in April was fine at 149 with HDL 48, LDL 90, continue simvastatin. He is up to date with Diabetes screening. Glucose was fine in April at 80. Last TSH was okay 18 months ago, 1.62. Plan redo blood work in April 2024. Immunization History   Administered Date(s) Administered    COVID-19 PFIZER VACCINE 0.3 ML IM 02/26/2021, 03/19/2021, 12/15/2021    Influenza Split High Dose Preservative Free IM 11/09/2015, 01/30/2017, 11/02/2017    Influenza, high dose seasonal 0.7 mL 11/12/2018, 12/05/2019, 10/09/2020, 10/26/2021, 10/28/2022    Pneumococcal Conjugate 13-Valent 01/04/2016    Pneumococcal Polysaccharide PPV23 08/22/2017    Td (adult), adsorbed 03/02/2009    Tdap 11/15/2016       Discussed Vaccines,    Pneumococcal vax is up to date  He does do yearly Flu shot. Tdap/tetanus shot is up to date  (done every 10 yrs for superficial cuts, every 5 yrs for deep wounds)  Can do 'shingles' shot, Shingrix at pharmacy. Covid vaccine booster can be done at pharmacy      Was never a smoker     Regarding Colon Cancer screening, routine screening not indicated. He did have adenoma on colonoscopy in January 2018, we discussed redoing colonoscopy, wants to hold off     Discussed pros and cons regarding Prostate Cancer screening,   PSA blood test not indicated-last PSA was stable in 2019. We discussed end of life planning, does not have a  "LIVING WILL" - does plan to do w     Glaucoma tx is up-to-date, uses drops.         He did see ENT in September, history tube/myringotomy left ear    Discussed importance of routine walking for exercise as tolerated, continue to try to watch a healthy diet.    Does have significant inguinal hernias especially on the right, just observing, wants to hold off on surgical evaluation, watch for pain. We will see him again in 6 months, sooner as needed. Advance Directives   What are advance directives? Advance directives are legal documents that state your wishes and plans for medical care. These plans are made ahead of time in case you lose your ability to make decisions for yourself. Advance directives can apply to any medical decision, such as the treatments you want, and if you want to donate organs. What are the types of advance directives? There are many types of advance directives, and each state has rules about how to use them. You may choose a combination of any of the following:  Living will: This is a written record of the treatment you want. You can also choose which treatments you do not want, which to limit, and which to stop at a certain time. This includes surgery, medicine, IV fluid, and tube feedings. Durable power of  for [facility]): This is a written record that states who you want to make healthcare choices for you when you are unable to make them for yourself. This person, called a proxy, is usually a family member or a friend. You may choose more than 1 proxy. Do not resuscitate (DNR) order:  A DNR order is used in case your heart stops beating or you stop breathing. It is a request not to have certain forms of treatment, such as CPR. A DNR order may be included in other types of advance directives. Medical directive: This covers the care that you want if you are in a coma, near death, or unable to make decisions for yourself. You can list the treatments you want for each condition. Treatment may include pain medicine, surgery, blood transfusions, dialysis, IV or tube feedings, and a ventilator (breathing machine). Values history:   This document has questions about your views, beliefs, and how you feel and think about life. This information can help others choose the care that you would choose. Why are advance directives important? An advance directive helps you control your care. Although spoken wishes may be used, it is better to have your wishes written down. Spoken wishes can be misunderstood, or not followed. Treatments may be given even if you do not want them. An advance directive may make it easier for your family to make difficult choices about your care.

## 2024-02-07 DIAGNOSIS — E78.00 HYPERCHOLESTEREMIA: ICD-10-CM

## 2024-02-07 RX ORDER — SIMVASTATIN 5 MG
5 TABLET ORAL DAILY
Qty: 90 TABLET | Refills: 1 | Status: SHIPPED | OUTPATIENT
Start: 2024-02-07

## 2024-04-18 ENCOUNTER — TELEPHONE (OUTPATIENT)
Age: 84
End: 2024-04-18

## 2024-04-18 NOTE — TELEPHONE ENCOUNTER
Patient returning call. Reports he had labs drawn 4/8/24 at [facility] on [address]. I did a CareEverywhere request and checked Media tab for faxed received. Noting is present in chart. Please advise. Patient thanks us for our help!

## 2024-04-18 NOTE — TELEPHONE ENCOUNTER
I called [facility] they will fax over the results to our office.    I also called pt L/m to advise him that we did not receive results from [facility] and that once we do receive them and Dr Bledsoe results them we would give him a call back

## 2024-04-19 ENCOUNTER — DOCUMENTATION (OUTPATIENT)
Dept: FAMILY MEDICINE CLINIC | Facility: CLINIC | Age: 84
End: 2024-04-19

## 2024-04-19 NOTE — PROGRESS NOTES
Called patient, blood work shows cholesterol, other testing are all okay, continue with medication as is, keep appointment May 28.      I received a fax report on his April 8 blood work from [facility], results are not under lab tab.

## 2024-04-23 NOTE — TELEPHONE ENCOUNTER
Patient called back regarding if fax of labs was recd from [facility]. Advised it was recd yesterday and once it's reviewed by Dr Bledsoe, he will receive a call with results.

## 2024-05-20 ENCOUNTER — RA CDI HCC (OUTPATIENT)
Dept: OTHER | Facility: HOSPITAL | Age: 84
End: 2024-05-20

## 2024-05-28 ENCOUNTER — OFFICE VISIT (OUTPATIENT)
Dept: FAMILY MEDICINE CLINIC | Facility: CLINIC | Age: 84
End: 2024-05-28
Payer: COMMERCIAL

## 2024-05-28 VITALS
RESPIRATION RATE: 16 BRPM | SYSTOLIC BLOOD PRESSURE: 130 MMHG | TEMPERATURE: 98.3 F | DIASTOLIC BLOOD PRESSURE: 72 MMHG | HEIGHT: 70 IN | OXYGEN SATURATION: 97 % | BODY MASS INDEX: 27.77 KG/M2 | WEIGHT: 194 LBS | HEART RATE: 64 BPM

## 2024-05-28 DIAGNOSIS — I10 ESSENTIAL HYPERTENSION: Primary | ICD-10-CM

## 2024-05-28 DIAGNOSIS — E78.00 HYPERCHOLESTEREMIA: ICD-10-CM

## 2024-05-28 PROCEDURE — 99214 OFFICE O/P EST MOD 30 MIN: CPT | Performed by: FAMILY MEDICINE

## 2024-05-28 PROCEDURE — G2211 COMPLEX E/M VISIT ADD ON: HCPCS | Performed by: FAMILY MEDICINE

## 2024-05-28 RX ORDER — SIMVASTATIN 10 MG
10 TABLET ORAL DAILY
Qty: 90 TABLET | Refills: 3 | Status: SHIPPED | OUTPATIENT
Start: 2024-05-28

## 2024-05-28 NOTE — PROGRESS NOTES
FAMILY PRACTICE OFFICE VISIT  Jose De Jesus Bledsoe D.O.    [facility]  [facility]  [address]   [address]  [place], [address]      NAME: Angelo Arciniega  AGE: 83 y.o. SEX: male  : 1940   MRN: [id number]    DATE: 2024  TIME: 11:51 AM      Assessment and Plan       Patient Instructions   1. Essential hypertension  Assessment & Plan:  Blood pressure today 130/72, he will continue HCTZ low-dose 25 mg 1/2 tablet daily.    April BUN/creatinine 13/0.60 with potassium 4.1.  2. Hypercholesteremia  Assessment & Plan:  April cholesterol 182 with HDL 49, LDL had risen from previous .    After discussion he will increase his simvastatin from 5 to 10 mg daily.  Will see him again in 6 months with his annual wellness, usually does blood work once yearly in April, consider ordering another lipid panel with CMP at follow-up  Orders:  -     simvastatin (ZOCOR) 10 mg tablet; Take 1 tablet (10 mg total) by mouth daily    Overall he is doing very well today.  No issues with inguinal hernia.  Regarding irregular sleep habits he has noted some improvement, good energy overall, he wants to hold off on doing a sleep study.    He did see ENT in March, has done well overall after prior procedures left ear.    Will see him in 6 months with his annual wellness    Note he uses health network labs, computer was unable to pull in labs from them, labs were scanned under media in April    Chief Complaint     Chief Complaint   Patient presents with    Follow-up     6m       History of Present Illness   Angelo Arciniega is a 83 y.o.-year-old male who is in today for a 6-month check, he has been feeling well overall, does have some ongoing issues with irregular sleep habits, falls asleep after dinner, good energy overall.      Review of Systems   Review of Systems   Constitutional:  Negative for appetite change, fatigue, fever and unexpected weight change.   HENT:  Negative for sore throat and trouble  "swallowing.    Respiratory:  Negative for cough, chest tightness and shortness of breath.    Cardiovascular:  Negative for chest pain, palpitations and leg swelling.   Gastrointestinal:  Negative for abdominal pain, blood in stool, nausea and vomiting.        No acid reflux     No change in bowel   Genitourinary:  Negative for dysuria and hematuria.   Neurological:  Negative for dizziness, syncope, light-headedness and headaches.   Psychiatric/Behavioral:  Positive for sleep disturbance. Negative for behavioral problems and confusion.        Active Problem List     Patient Active Problem List   Diagnosis    Adenomatous polyp of rectum    Allergic rhinitis    Dry mouth    Glaucoma    Hypercholesteremia    Essential hypertension    Nephrolithiasis    Osteoarthritis, knee    Varicose vein of leg    Bilateral inguinal hernia    Dysfunction of left eustachian tube       Past Medical History:  Reviewed    Past Surgical History:  Reviewed    Family History:  Reviewed    Social History:  Reviewed    Objective     Vitals:    05/28/24 1116   BP: 130/72   BP Location: Left arm   Patient Position: Sitting   Cuff Size: Standard   Pulse: 64   Resp: 16   Temp: 98.3 °F (36.8 °C)   TempSrc: Tympanic   SpO2: 97%   Weight: 88 kg (194 lb)   Height: 5' 10\" (1.778 m)     Body mass index is 27.84 kg/m².    BP Readings from Last 3 Encounters:   05/28/24 130/72   03/26/24 128/80   11/27/23 136/80       Wt Readings from Last 3 Encounters:   05/28/24 88 kg (194 lb)   03/26/24 85.7 kg (189 lb)   11/27/23 84.1 kg (185 lb 6.4 oz)       Physical Exam  Constitutional:       General: He is not in acute distress.     Appearance: Normal appearance. He is well-developed. He is not ill-appearing.   HENT:      Right Ear: Ear canal normal.      Left Ear: Ear canal normal.   Eyes:      General: No scleral icterus.  Neck:      Vascular: No carotid bruit.   Cardiovascular:      Rate and Rhythm: Normal rate and regular rhythm.      Heart sounds: Normal heart " sounds. No murmur heard.  Pulmonary:      Effort: Pulmonary effort is normal. No respiratory distress.      Breath sounds: Normal breath sounds. No wheezing, rhonchi or rales.   Abdominal:      Palpations: Abdomen is soft.      Tenderness: There is no abdominal tenderness.   Musculoskeletal:      Right lower leg: No edema.      Left lower leg: No edema.   Lymphadenopathy:      Cervical: No cervical adenopathy.   Skin:     Coloration: Skin is not jaundiced.   Neurological:      General: No focal deficit present.      Mental Status: He is alert and oriented to person, place, and time.   Psychiatric:         Mood and Affect: Mood normal.         Behavior: Behavior normal.         ALLERGIES:  Allergies   Allergen Reactions    Penicillins      Other reaction(s): Unknown Allergic Reaction       Current Medications     Current Outpatient Medications   Medication Sig Dispense Refill    hydrochlorothiazide (HYDRODIURIL) 25 mg tablet TAKE 1/2 TABLET BY MOUTH EVERY DAY 45 tablet 3    latanoprost (XALATAN) 0.005 % ophthalmic solution Apply 1 drop to eye every evening Left Eye      simvastatin (ZOCOR) 10 mg tablet Take 1 tablet (10 mg total) by mouth daily 90 tablet 3    timolol (BETIMOL) 0.5 % ophthalmic solution 1 drop 2 (two) times a day       No current facility-administered medications for this visit.            No orders of the defined types were placed in this encounter.        Jose De Jesus Bledsoe DO

## 2024-05-28 NOTE — ASSESSMENT & PLAN NOTE
April cholesterol 182 with HDL 49, LDL had risen from previous .    After discussion he will increase his simvastatin from 5 to 10 mg daily.  Will see him again in 6 months with his annual wellness, usually does blood work once yearly in April, consider ordering another lipid panel with CMP at follow-up

## 2024-05-28 NOTE — PATIENT INSTRUCTIONS
1. Essential hypertension  Assessment & Plan:  Blood pressure today 130/72, he will continue HCTZ low-dose 25 mg 1/2 tablet daily.    April BUN/creatinine 13/0.60 with potassium 4.1.  2. Hypercholesteremia  Assessment & Plan:  April cholesterol 182 with HDL 49, LDL had risen from previous .    After discussion he will increase his simvastatin from 5 to 10 mg daily.  Will see him again in 6 months with his annual wellness, usually does blood work once yearly in April, consider ordering another lipid panel with CMP at follow-up  Orders:  -     simvastatin (ZOCOR) 10 mg tablet; Take 1 tablet (10 mg total) by mouth daily    Overall he is doing very well today.  No issues with inguinal hernia.  Regarding irregular sleep habits he has noted some improvement, good energy overall, he wants to hold off on doing a sleep study.    He did see ENT in March, has done well overall after prior procedures left ear.    Will see him in 6 months with his annual wellness    Note he uses health network labs, computer was unable to pull in labs from them, labs were scanned under media in April

## 2024-05-28 NOTE — ASSESSMENT & PLAN NOTE
Blood pressure today 130/72, he will continue HCTZ low-dose 25 mg 1/2 tablet daily.    April BUN/creatinine 13/0.60 with potassium 4.1.

## 2024-07-16 DIAGNOSIS — I10 ESSENTIAL HYPERTENSION: ICD-10-CM

## 2024-07-16 RX ORDER — HYDROCHLOROTHIAZIDE 25 MG/1
12.5 TABLET ORAL DAILY
Qty: 15 TABLET | Refills: 0 | Status: SHIPPED | OUTPATIENT
Start: 2024-07-16

## 2024-11-25 ENCOUNTER — RA CDI HCC (OUTPATIENT)
Dept: OTHER | Facility: HOSPITAL | Age: 84
End: 2024-11-25

## 2024-12-02 NOTE — PATIENT INSTRUCTIONS
"April 2024 glucose 92, CBC/CMP were okay, hemoglobin 13.3    Last TSH was in 2022, TSH was okay 1.62, consider again next year    Immunization History   Administered Date(s) Administered    COVID-19 PFIZER VACCINE 0.3 ML IM 02/26/2021, 03/19/2021, 03/26/2021, 12/15/2021    Influenza Split High Dose Preservative Free IM 11/09/2015, 01/30/2017, 11/02/2017    Influenza, high dose seasonal 0.7 mL 11/12/2018, 12/05/2019, 10/09/2020, 10/26/2021, 10/28/2022, 11/27/2023    Pneumococcal Conjugate 13-Valent 01/04/2016    Pneumococcal Polysaccharide PPV23 08/22/2017    Td (adult), adsorbed 03/02/2009    Tdap 11/15/2016       Discussed Vaccines,    Pneumococcal vax is up to date  He does do yearly Flu shot.  Was given today  Tdap/tetanus shot is up to date  (done every 10 yrs for superficial cuts, every 5 yrs for deep wounds)  Can do 'shingles' shot, Shingrix at pharmacy.  Covid vaccine booster can be done at pharmacy    Never a smoker     Regarding Colon Cancer screening, routine screening not indicated, did have adenoma on scope in 2018, bowels have been fine for quite some time    We discussed end of life planning, does not have a  \"LIVING WILL\"  -has a  he plans to see to work on this.    Glaucoma being treated through his eye doctor with drops, considering cataract surgery.    He does see ENT, doing okay after tubes left TM.      He should set up an appointment with dermatology to check scalp.    Discussed importance of routine healthy diet.  He is up about 10 pounds versus last year, does not appear to have fluid retention, appears related to diet.    We will see him again in 6 months, sooner as needed.    Advance Directives   What are advance directives?  Advance directives are legal documents that state your wishes and plans for medical care. These plans are made ahead of time in case you lose your ability to make decisions for yourself. Advance directives can apply to any medical decision, such as the treatments " you want, and if you want to donate organs.   What are the types of advance directives?  There are many types of advance directives, and each state has rules about how to use them. You may choose a combination of any of the following:  Living will:  This is a written record of the treatment you want. You can also choose which treatments you do not want, which to limit, and which to stop at a certain time. This includes surgery, medicine, IV fluid, and tube feedings.   Durable power of  for healthcare (DPAHC):  This is a written record that states who you want to make healthcare choices for you when you are unable to make them for yourself. This person, called a proxy, is usually a family member or a friend. You may choose more than 1 proxy.  Do not resuscitate (DNR) order:  A DNR order is used in case your heart stops beating or you stop breathing. It is a request not to have certain forms of treatment, such as CPR. A DNR order may be included in other types of advance directives.  Medical directive:  This covers the care that you want if you are in a coma, near death, or unable to make decisions for yourself. You can list the treatments you want for each condition. Treatment may include pain medicine, surgery, blood transfusions, dialysis, IV or tube feedings, and a ventilator (breathing machine).  Values history:  This document has questions about your views, beliefs, and how you feel and think about life. This information can help others choose the care that you would choose.  Why are advance directives important?  An advance directive helps you control your care. Although spoken wishes may be used, it is better to have your wishes written down. Spoken wishes can be misunderstood, or not followed. Treatments may be given even if you do not want them. An advance directive may make it easier for your family to make difficult choices about your care.

## 2024-12-03 ENCOUNTER — OFFICE VISIT (OUTPATIENT)
Dept: FAMILY MEDICINE CLINIC | Facility: CLINIC | Age: 84
End: 2024-12-03
Payer: COMMERCIAL

## 2024-12-03 VITALS
HEART RATE: 56 BPM | BODY MASS INDEX: 28.41 KG/M2 | RESPIRATION RATE: 12 BRPM | OXYGEN SATURATION: 98 % | WEIGHT: 198 LBS | SYSTOLIC BLOOD PRESSURE: 136 MMHG | DIASTOLIC BLOOD PRESSURE: 78 MMHG

## 2024-12-03 DIAGNOSIS — E78.00 HYPERCHOLESTEREMIA: ICD-10-CM

## 2024-12-03 DIAGNOSIS — G47.9 SLEEP DISTURBANCE: ICD-10-CM

## 2024-12-03 DIAGNOSIS — N20.0 NEPHROLITHIASIS: ICD-10-CM

## 2024-12-03 DIAGNOSIS — Z00.00 MEDICARE ANNUAL WELLNESS VISIT, SUBSEQUENT: Primary | ICD-10-CM

## 2024-12-03 DIAGNOSIS — K40.20 BILATERAL INGUINAL HERNIA WITHOUT OBSTRUCTION OR GANGRENE, RECURRENCE NOT SPECIFIED: ICD-10-CM

## 2024-12-03 DIAGNOSIS — Z23 ENCOUNTER FOR IMMUNIZATION: ICD-10-CM

## 2024-12-03 DIAGNOSIS — I10 ESSENTIAL HYPERTENSION: ICD-10-CM

## 2024-12-03 PROCEDURE — 90471 IMMUNIZATION ADMIN: CPT

## 2024-12-03 PROCEDURE — G0439 PPPS, SUBSEQ VISIT: HCPCS | Performed by: FAMILY MEDICINE

## 2024-12-03 PROCEDURE — 90662 IIV NO PRSV INCREASED AG IM: CPT

## 2024-12-03 PROCEDURE — 99213 OFFICE O/P EST LOW 20 MIN: CPT | Performed by: FAMILY MEDICINE

## 2024-12-03 RX ORDER — BRIMONIDINE TARTRATE AND TIMOLOL MALEATE 2; 5 MG/ML; MG/ML
SOLUTION OPHTHALMIC
COMMUNITY
Start: 2024-10-02

## 2024-12-03 NOTE — ASSESSMENT & PLAN NOTE
Cholesterol earlier this year was 182 with HDL 49, , we did increase simvastatin to 10 mg daily, he will redo lipid panel in January.  Orders:    Lipid panel

## 2024-12-03 NOTE — PROGRESS NOTES
Name: Angelo Arciniega      : 1940      MRN: [id number]  Encounter Provider: Jose De Jesus Bledsoe DO  Encounter Date: 12/3/2024   Encounter department: [facility]  :  Assessment & Plan  Medicare annual wellness visit, subsequent         Essential hypertension  Blood pressure was okay after sitting at 136/78-continues on hydrochlorothiazide 25 mg 1/2 tablet daily.  Back in April creatinine was 0.6 with potassium 4.1, slip given to redo fasting blood work in January.  He does blood work at [facility] labs, we did not receive last April blood work through the system, those labs were scanned in under media.      Orders:    CBC    Comprehensive metabolic panel    Hypercholesteremia  Cholesterol earlier this year was 182 with HDL 49, , we did increase simvastatin to 10 mg daily, he will redo lipid panel in January.  Orders:    Lipid panel    Nephrolithiasis  Remote history kidney stones, has had no issues.  Orders:    CBC    Comprehensive metabolic panel    Encounter for immunization    Orders:    influenza vaccine, high-dose, PF 0.5 mL (Fluzone High Dose)    Inguinal hernia  ( right )  No new complaints with inguinal hernia, continues to observe, watch for increasing pain       Sleep disturbance  Try to keep routine sleep habits, just observe, avoid medications for sleep.       Patient Instructions   2024 glucose 92, CBC/CMP were okay, hemoglobin 13.3    Last TSH was in , TSH was okay 1.62, consider again next year    Immunization History   Administered Date(s) Administered    COVID-19 PFIZER VACCINE 0.3 ML IM 2021, 2021, 2021, 12/15/2021    Influenza Split High Dose Preservative Free IM 2015, 2017, 2017    Influenza, high dose seasonal 0.7 mL 2018, 2019, 10/09/2020, 10/26/2021, 10/28/2022, 2023    Pneumococcal Conjugate 13-Valent 2016    Pneumococcal Polysaccharide PPV23 2017    Td (adult), adsorbed  "03/02/2009    Tdap 11/15/2016       Discussed Vaccines,    Pneumococcal vax is up to date  He does do yearly Flu shot.  Was given today  Tdap/tetanus shot is up to date  (done every 10 yrs for superficial cuts, every 5 yrs for deep wounds)  Can do 'shingles' shot, Shingrix at pharmacy.  Covid vaccine booster can be done at pharmacy    Never a smoker     Regarding Colon Cancer screening, routine screening not indicated, did have adenoma on scope in 2018, bowels have been fine for quite some time    We discussed end of life planning, does not have a  \"LIVING WILL\"  -has a  he plans to see to work on this.    Glaucoma being treated through his eye doctor with drops, considering cataract surgery.    He does see ENT, doing okay after tubes left TM.      He should set up an appointment with dermatology to check scalp.    Discussed importance of routine healthy diet.  He is up about 10 pounds versus last year, does not appear to have fluid retention, appears related to diet.    We will see him again in 6 months, sooner as needed.    Advance Directives   What are advance directives?  Advance directives are legal documents that state your wishes and plans for medical care. These plans are made ahead of time in case you lose your ability to make decisions for yourself. Advance directives can apply to any medical decision, such as the treatments you want, and if you want to donate organs.   What are the types of advance directives?  There are many types of advance directives, and each state has rules about how to use them. You may choose a combination of any of the following:  Living will:  This is a written record of the treatment you want. You can also choose which treatments you do not want, which to limit, and which to stop at a certain time. This includes surgery, medicine, IV fluid, and tube feedings.   Durable power of  for healthcare (DPAHC):  This is a written record that states who you want to make " healthcare choices for you when you are unable to make them for yourself. This person, called a proxy, is usually a family member or a friend. You may choose more than 1 proxy.  Do not resuscitate (DNR) order:  A DNR order is used in case your heart stops beating or you stop breathing. It is a request not to have certain forms of treatment, such as CPR. A DNR order may be included in other types of advance directives.  Medical directive:  This covers the care that you want if you are in a coma, near death, or unable to make decisions for yourself. You can list the treatments you want for each condition. Treatment may include pain medicine, surgery, blood transfusions, dialysis, IV or tube feedings, and a ventilator (breathing machine).  Values history:  This document has questions about your views, beliefs, and how you feel and think about life. This information can help others choose the care that you would choose.  Why are advance directives important?  An advance directive helps you control your care. Although spoken wishes may be used, it is better to have your wishes written down. Spoken wishes can be misunderstood, or not followed. Treatments may be given even if you do not want them. An advance directive may make it easier for your family to make difficult choices about your care.                     History of Present Illness     He is in today for a routine annual wellness/regular physical, overall he has been feeling well.      Review of Systems   Constitutional:         Has had some ongoing issues with sleep, falls asleep after dinner, awakens and watches TV, has good energy overall.  Has had no new issues with inguinal hernia.  No new chest pains, no increased shortness of breath.  No change in bowel or bladder, bowels have been fine recently   HENT:          Does continue to see ENT, did okay with left tube   Eyes:         He does see his eye doctor, is on drops for glaucoma, considering cataract  surgery         Current Outpatient Medications:     brimonidine-timolol (COMBIGAN) 0.2-0.5 %, instill 1 drop into both eyes twice a day, Disp: , Rfl:     hydroCHLOROthiazide 25 mg tablet, TAKE 1/2 TABLET BY MOUTH DAILY, Disp: 45 tablet, Rfl: 3    latanoprost (XALATAN) 0.005 % ophthalmic solution, Apply 1 drop to eye every evening Left Eye, Disp: , Rfl:     simvastatin (ZOCOR) 10 mg tablet, Take 1 tablet (10 mg total) by mouth daily, Disp: 90 tablet, Rfl: 3    ofloxacin (OCUFLOX) 0.3 % ophthalmic solution, Instill 5 drops in left ear two times a day for two weeks prior to follow up (Patient not taking: Reported on 12/3/2024), Disp: 5 mL, Rfl: 0      Objective   /78 (BP Location: Left arm, Patient Position: Sitting, Cuff Size: Standard)   Pulse 56   Resp 12   Wt 89.8 kg (198 lb)   SpO2 98%   BMI 28.41 kg/m²      Physical Exam  Constitutional:       Appearance: Normal appearance. He is not ill-appearing.      Comments: Seated no acute distress, blood pressure was okay after sitting.  Has no scleral icterus/pallor.  Heart was regular rate and rhythm, lungs are clear and equal bilaterally, abdomen was soft and nontender, had no significant swelling.  Neurologically at baseline    Does have actinic keratoses on scalp.   HENT:      Ears:      Comments: Hard of hearing  Eyes:      General: No scleral icterus.  Cardiovascular:      Rate and Rhythm: Normal rate and regular rhythm.   Pulmonary:      Effort: No respiratory distress.      Breath sounds: Normal breath sounds. No wheezing, rhonchi or rales.   Abdominal:      General: There is no distension.      Palpations: Abdomen is soft.      Tenderness: There is no abdominal tenderness. There is no guarding.   Musculoskeletal:      Right lower leg: No edema.      Left lower leg: No edema.   Lymphadenopathy:      Cervical: No cervical adenopathy.   Skin:     Coloration: Skin is not jaundiced.   Neurological:      Mental Status: He is alert. Mental status is at  baseline.   Psychiatric:         Behavior: Behavior normal.             Annual Wellness Visit Questionnaire   Angelo is here for his Subsequent Wellness visit.     Health Risk Assessment:   Patient rates overall health as good. Patient feels that their physical health rating is same. Patient is satisfied with their life. Eyesight was rated as same. Hearing was rated as same. Patient feels that their emotional and mental health rating is same. Patients states they are never, rarely angry. Patient states they are sometimes unusually tired/fatigued. Pain experienced in the last 7 days has been some. Patient's pain rating has been 1/10. Patient states that he has experienced no weight loss or gain in last 6 months.     Depression Screening:   PHQ-2 Score: 0  PHQ-9 Score: 0      Fall Risk Screening:   In the past year, patient has experienced: no history of falling in past year      Home Safety:  Patient does not have trouble with stairs inside or outside of their home. Patient has working smoke alarms and has working carbon monoxide detector. Home safety hazards include: none.     Nutrition:   Current diet is Regular.     Medications:   Patient is currently taking over-the-counter supplements. OTC medications include: see medication list. Patient is able to manage medications.     Activities of Daily Living (ADLs)/Instrumental Activities of Daily Living (IADLs):   Walk and transfer into and out of bed and chair?: Yes  Dress and groom yourself?: Yes    Bathe or shower yourself?: Yes    Feed yourself? Yes  Do your laundry/housekeeping?: Yes  Manage your money, pay your bills and track your expenses?: Yes  Make your own meals?: Yes    Do your own shopping?: Yes    Previous Hospitalizations:   Any hospitalizations or ED visits within the last 12 months?: No      Advance Care Planning:     Durable POA for healthcare: No    Advanced directive: No      PREVENTIVE SCREENINGS      Cardiovascular Screening:    General: Screening  Not Indicated and History Lipid Disorder      Prostate Cancer Screening:    General: Screening Not Indicated      Lung Cancer Screening:     General: Screening Not Indicated    Screening, Brief Intervention, and Referral to Treatment (SBIRT)    Screening  Typical number of drinks in a day: 2  Typical number of drinks in a week: 1  Interpretation: Low risk drinking behavior.    Single Item Drug Screening:  How often have you used an illegal drug (including marijuana) or a prescription medication for non-medical reasons in the past year? never    Single Item Drug Screen Score: 0  Interpretation: Negative screen for possible drug use disorder    Social Drivers of Health     Financial Resource Strain: Low Risk  (11/27/2023)    Overall Financial Resource Strain (CARDIA)     Difficulty of Paying Living Expenses: Not hard at all   Food Insecurity: No Food Insecurity (12/3/2024)    Hunger Vital Sign     Worried About Running Out of Food in the Last Year: Never true     Ran Out of Food in the Last Year: Never true   Transportation Needs: No Transportation Needs (12/3/2024)    PRAPARE - Transportation     Lack of Transportation (Medical): No     Lack of Transportation (Non-Medical): No   Housing Stability: Unknown (12/3/2024)    Housing Stability Vital Sign     Unable to Pay for Housing in the Last Year: No     Homeless in the Last Year: No   Utilities: Not At Risk (12/3/2024)    [facility] Utilities     Threatened with loss of utilities: No

## 2024-12-03 NOTE — ASSESSMENT & PLAN NOTE
Blood pressure was okay after sitting at 136/78-continues on hydrochlorothiazide 25 mg 1/2 tablet daily.  Back in April creatinine was 0.6 with potassium 4.1, slip given to redo fasting blood work in January.  He does blood work at [facility] network labs, we did not receive last April blood work through the system, those labs were scanned in under media.      Orders:    CBC    Comprehensive metabolic panel

## 2024-12-03 NOTE — ASSESSMENT & PLAN NOTE
Remote history kidney stones, has had no issues.  Orders:    CBC    Comprehensive metabolic panel

## 2024-12-17 ENCOUNTER — RESULTS FOLLOW-UP (OUTPATIENT)
Dept: FAMILY MEDICINE CLINIC | Facility: CLINIC | Age: 84
End: 2024-12-17

## 2024-12-17 LAB
ALBUMIN SERPL-MCNC: 3.9 G/DL (ref 3.5–5.7)
ALP SERPL-CCNC: 45 U/L (ref 35–120)
ALT SERPL-CCNC: 18 U/L
ANION GAP SERPL CALCULATED.3IONS-SCNC: 10 MMOL/L (ref 3–11)
AST SERPL-CCNC: 20 U/L
BILIRUB SERPL-MCNC: 0.7 MG/DL (ref 0.2–1)
BUN SERPL-MCNC: 10 MG/DL (ref 7–28)
CALCIUM SERPL-MCNC: 9.2 MG/DL (ref 8.5–10.5)
CHLORIDE SERPL-SCNC: 103 MMOL/L (ref 100–109)
CHOLEST SERPL-MCNC: 169 MG/DL
CHOLEST/HDLC SERPL: 3.6 {RATIO}
CO2 SERPL-SCNC: 29 MMOL/L (ref 21–31)
CREAT SERPL-MCNC: 0.68 MG/DL (ref 0.53–1.3)
CYTOLOGY CMNT CVX/VAG CYTO-IMP: NORMAL
ERYTHROCYTE [DISTWIDTH] IN BLOOD BY AUTOMATED COUNT: 13.7 % (ref 12–16)
GFR/BSA.PRED SERPLBLD CYS-BASED-ARV: 93 ML/MIN/{1.73_M2}
GLUCOSE SERPL-MCNC: 87 MG/DL (ref 65–99)
HCT VFR BLD AUTO: 41.3 % (ref 37–48)
HDLC SERPL-MCNC: 47 MG/DL (ref 23–92)
HGB BLD-MCNC: 14 G/DL (ref 12.5–17)
LDLC SERPL CALC-MCNC: 109 MG/DL
MCH RBC QN AUTO: 29.4 PG (ref 27–36)
MCHC RBC AUTO-ENTMCNC: 33.9 G/DL (ref 32–37)
MCV RBC AUTO: 87 FL (ref 80–100)
NONHDLC SERPL-MCNC: 122 MG/DL
PLATELET # BLD AUTO: 205 THOU/CMM (ref 140–350)
PMV BLD REES-ECKER: 9 FL (ref 7.5–11.3)
POTASSIUM SERPL-SCNC: 4.2 MMOL/L (ref 3.5–5.2)
PROT SERPL-MCNC: 6.4 G/DL (ref 6.3–8.3)
RBC # BLD AUTO: 4.76 MILL/CMM (ref 4–5.4)
SODIUM SERPL-SCNC: 142 MMOL/L (ref 135–145)
TRIGL SERPL-MCNC: 66 MG/DL
WBC # BLD AUTO: 6.6 THOU/CMM (ref 4–10.5)

## 2025-01-05 ENCOUNTER — OFFICE VISIT (OUTPATIENT)
Dept: URGENT CARE | Age: 85
End: 2025-01-05
Payer: COMMERCIAL

## 2025-01-05 VITALS
DIASTOLIC BLOOD PRESSURE: 80 MMHG | HEART RATE: 88 BPM | OXYGEN SATURATION: 96 % | TEMPERATURE: 99.6 F | SYSTOLIC BLOOD PRESSURE: 144 MMHG | RESPIRATION RATE: 18 BRPM

## 2025-01-05 DIAGNOSIS — R19.7 DIARRHEA, UNSPECIFIED TYPE: Primary | ICD-10-CM

## 2025-01-05 LAB
SL AMB  POCT GLUCOSE, UA: NEGATIVE
SL AMB LEUKOCYTE ESTERASE,UA: NEGATIVE
SL AMB POCT BILIRUBIN,UA: NEGATIVE
SL AMB POCT BLOOD,UA: NEGATIVE
SL AMB POCT CLARITY,UA: CLEAR
SL AMB POCT COLOR,UA: NORMAL
SL AMB POCT KETONES,UA: 5
SL AMB POCT NITRITE,UA: NEGATIVE
SL AMB POCT PH,UA: 6
SL AMB POCT SPECIFIC GRAVITY,UA: 1.01
SL AMB POCT URINE PROTEIN: NEGATIVE
SL AMB POCT UROBILINOGEN: 0.2

## 2025-01-05 PROCEDURE — 81002 URINALYSIS NONAUTO W/O SCOPE: CPT

## 2025-01-05 PROCEDURE — 99213 OFFICE O/P EST LOW 20 MIN: CPT

## 2025-01-05 NOTE — PATIENT INSTRUCTIONS
Diet as tolerated  Drink plenty of water!  May drink pedialyte or gatorade zero for electrolyte replacement  Follow up with PCP in 3-5 days.  Proceed to ER if symptoms worsen, including but not limited to, persistent diarrhea, blood in stool, vomiting, headache, dizziness, lightheadedness, abdominal pain, fever, chills, chest pain, SOB, palpitations, etc.
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
## 2025-01-06 ENCOUNTER — DOCUMENTATION (OUTPATIENT)
Dept: ADMINISTRATIVE | Facility: OTHER | Age: 85
End: 2025-01-06

## 2025-01-06 NOTE — PROGRESS NOTES
Urgent Care BP  Received: Yesterday  Hanna Olivo PA-C  P Patient Reported Team         Blood pressure elevated  Appointment department: [facility]  Appointment provider: Hanna Olivo PA-C  Blood pressure  01/05/25 0937 144/80  01/05/25 0845 144/80    01/06/25 1:21 PM    Patient was called after the Urgent Care visit ; a message was left for the patient to return the call    Thank you.  Frank Martell MA  [facility]

## 2025-02-17 ENCOUNTER — TELEPHONE (OUTPATIENT)
Age: 85
End: 2025-02-17

## 2025-02-18 NOTE — TELEPHONE ENCOUNTER
Patient returned the office phone call. I advised the patient of the provider's previous message. Patient expressed understanding and did not have any further questions.

## 2025-02-18 NOTE — TELEPHONE ENCOUNTER
Please call him back and clarify, often the chart pulls in duplicate meds and it may show that something was stopped when it just means one of the duplicates was removed    Our current chart states he received his eyedrops from [facility] pharmacy December 14, we currently have him on brimonidine/timolol drops       Dispensed Days Supply Quantity Provider Pharmacy   BRIMONIDINE-TIMOLOL 0.2%-0.5% 12/14/2024 75 15 mL Ja Collado Jr., MD [facility]/pharmacy #[id number] - ...   BRIMONIDINE-TIMOLOL 0.2%-0.5% 10/02/2024 75 15 mL Ja Collado Jr., MD [facility]/pharmacy #[id number] - ...   BRIMONIDINE-TIMOLOL 0.2%-0.5% 08/03/2024 75 15 mL Isauro Locke MD [facility]/pharmacy #[id number] - ...   BRIMONIDINE-TIMOLOL 0.2%-0.5% 05/25/2024 75 15 mL Isauro Locke MD [facility]/pharmacy #[id number] - ...

## 2025-02-21 ENCOUNTER — TELEPHONE (OUTPATIENT)
Age: 85
End: 2025-02-21

## 2025-02-21 NOTE — TELEPHONE ENCOUNTER
Patient stated he got a letter that Dr. Bledsoe is retiring. He misplaced the letter and wanted to know if it is possible that he could have another one mailed to him as he would like to file it in his records.

## 2025-05-21 DIAGNOSIS — E78.00 HYPERCHOLESTEREMIA: ICD-10-CM

## 2025-05-21 RX ORDER — SIMVASTATIN 10 MG
10 TABLET ORAL DAILY
Qty: 90 TABLET | Refills: 3 | Status: CANCELLED | OUTPATIENT
Start: 2025-05-21

## 2025-05-22 ENCOUNTER — TELEPHONE (OUTPATIENT)
Dept: FAMILY MEDICINE CLINIC | Facility: CLINIC | Age: 85
End: 2025-05-22

## 2025-05-22 NOTE — TELEPHONE ENCOUNTER
Called pt to go over med refiil. Pt informed he has a new healthcare team which is not [facility] and he did not give me any name.

## 2025-05-27 DIAGNOSIS — E78.00 HYPERCHOLESTEREMIA: ICD-10-CM

## 2025-05-27 RX ORDER — SIMVASTATIN 10 MG
10 TABLET ORAL DAILY
Qty: 90 TABLET | Refills: 3 | Status: CANCELLED | OUTPATIENT
Start: 2025-05-27

## 2025-05-29 NOTE — TELEPHONE ENCOUNTER
Patient called stating he is seeing a new Primary Care Provider and will be getting his prescriptions through the new Primary Care Provider.

## 2025-06-09 NOTE — TELEPHONE ENCOUNTER
06/09/25 5:37 PM     The office's request has been received and reviewed.    The PCP has been successfully removed with a patient attribution note.     Please remind staff to not remove the PCP at office level for this scenario.     This message will now be completed.    Thank you  Priscila Batista

## (undated) DEVICE — SINGLE-USE POLYPECTOMY SNARE: Brand: ROTATABLE SNARE

## (undated) DEVICE — SINGLE-USE BIOPSY FORCEPS: Brand: RADIAL JAW 4